# Patient Record
Sex: MALE | Race: BLACK OR AFRICAN AMERICAN | NOT HISPANIC OR LATINO | Employment: FULL TIME | URBAN - METROPOLITAN AREA
[De-identification: names, ages, dates, MRNs, and addresses within clinical notes are randomized per-mention and may not be internally consistent; named-entity substitution may affect disease eponyms.]

---

## 2021-02-20 ENCOUNTER — IMMUNIZATION (OUTPATIENT)
Dept: PRIMARY CARE CLINIC | Facility: CLINIC | Age: 66
End: 2021-02-20
Payer: COMMERCIAL

## 2021-02-20 DIAGNOSIS — Z23 NEED FOR VACCINATION: Primary | ICD-10-CM

## 2021-02-20 PROCEDURE — 91300 PR SARS-COV- 2 COVID-19 VACCINE, NO PRSV, 30MCG/0.3ML, IM: ICD-10-PCS | Mod: S$GLB,,, | Performed by: INTERNAL MEDICINE

## 2021-02-20 PROCEDURE — 0001A PR IMMUNIZ ADMIN, SARS-COV-2 COVID-19 VACC, 30MCG/0.3ML, 1ST DOSE: ICD-10-PCS | Mod: CV19,S$GLB,, | Performed by: INTERNAL MEDICINE

## 2021-02-20 PROCEDURE — 91300 PR SARS-COV- 2 COVID-19 VACCINE, NO PRSV, 30MCG/0.3ML, IM: CPT | Mod: S$GLB,,, | Performed by: INTERNAL MEDICINE

## 2021-02-20 PROCEDURE — 0001A PR IMMUNIZ ADMIN, SARS-COV-2 COVID-19 VACC, 30MCG/0.3ML, 1ST DOSE: CPT | Mod: CV19,S$GLB,, | Performed by: INTERNAL MEDICINE

## 2021-02-20 RX ADMIN — Medication 0.3 ML: at 03:02

## 2021-03-13 ENCOUNTER — IMMUNIZATION (OUTPATIENT)
Dept: PRIMARY CARE CLINIC | Facility: CLINIC | Age: 66
End: 2021-03-13
Payer: COMMERCIAL

## 2021-03-13 DIAGNOSIS — Z23 NEED FOR VACCINATION: Primary | ICD-10-CM

## 2021-03-13 PROCEDURE — 0002A PR IMMUNIZ ADMIN, SARS-COV-2 COVID-19 VACC, 30MCG/0.3ML, 2ND DOSE: ICD-10-PCS | Mod: CV19,S$GLB,, | Performed by: INTERNAL MEDICINE

## 2021-03-13 PROCEDURE — 91300 PR SARS-COV- 2 COVID-19 VACCINE, NO PRSV, 30MCG/0.3ML, IM: CPT | Mod: S$GLB,,, | Performed by: INTERNAL MEDICINE

## 2021-03-13 PROCEDURE — 91300 PR SARS-COV- 2 COVID-19 VACCINE, NO PRSV, 30MCG/0.3ML, IM: ICD-10-PCS | Mod: S$GLB,,, | Performed by: INTERNAL MEDICINE

## 2021-03-13 PROCEDURE — 0002A PR IMMUNIZ ADMIN, SARS-COV-2 COVID-19 VACC, 30MCG/0.3ML, 2ND DOSE: CPT | Mod: CV19,S$GLB,, | Performed by: INTERNAL MEDICINE

## 2021-03-13 RX ADMIN — Medication 0.3 ML: at 03:03

## 2021-04-07 ENCOUNTER — LAB VISIT (OUTPATIENT)
Dept: LAB | Facility: HOSPITAL | Age: 66
End: 2021-04-07
Attending: UROLOGY
Payer: MEDICARE

## 2021-04-07 ENCOUNTER — OFFICE VISIT (OUTPATIENT)
Dept: UROLOGY | Facility: CLINIC | Age: 66
End: 2021-04-07
Payer: MEDICARE

## 2021-04-07 VITALS
HEART RATE: 68 BPM | SYSTOLIC BLOOD PRESSURE: 121 MMHG | HEIGHT: 68 IN | DIASTOLIC BLOOD PRESSURE: 67 MMHG | BODY MASS INDEX: 31.11 KG/M2 | WEIGHT: 205.25 LBS

## 2021-04-07 DIAGNOSIS — N13.8 BPH WITH URINARY OBSTRUCTION: Primary | ICD-10-CM

## 2021-04-07 DIAGNOSIS — E11.69 TYPE 2 DIABETES MELLITUS WITH OTHER SPECIFIED COMPLICATION, WITHOUT LONG-TERM CURRENT USE OF INSULIN: ICD-10-CM

## 2021-04-07 DIAGNOSIS — I10 ESSENTIAL HYPERTENSION: ICD-10-CM

## 2021-04-07 DIAGNOSIS — N13.8 BPH WITH URINARY OBSTRUCTION: ICD-10-CM

## 2021-04-07 DIAGNOSIS — N52.01 ERECTILE DYSFUNCTION DUE TO ARTERIAL INSUFFICIENCY: ICD-10-CM

## 2021-04-07 DIAGNOSIS — N40.1 BPH WITH URINARY OBSTRUCTION: Primary | ICD-10-CM

## 2021-04-07 DIAGNOSIS — N40.1 BPH WITH URINARY OBSTRUCTION: ICD-10-CM

## 2021-04-07 PROBLEM — N52.9 ERECTILE DYSFUNCTION: Status: ACTIVE | Noted: 2021-04-07

## 2021-04-07 PROBLEM — E11.9 TYPE 2 DIABETES MELLITUS, WITHOUT LONG-TERM CURRENT USE OF INSULIN: Status: ACTIVE | Noted: 2021-04-07

## 2021-04-07 LAB
COMPLEXED PSA SERPL-MCNC: 0.37 NG/ML (ref 0–4)
ESTIMATED AVG GLUCOSE: 154 MG/DL (ref 68–131)
HBA1C MFR BLD: 7 % (ref 4–5.6)

## 2021-04-07 PROCEDURE — 3074F PR MOST RECENT SYSTOLIC BLOOD PRESSURE < 130 MM HG: ICD-10-PCS | Mod: CPTII,S$GLB,, | Performed by: UROLOGY

## 2021-04-07 PROCEDURE — 1101F PT FALLS ASSESS-DOCD LE1/YR: CPT | Mod: CPTII,S$GLB,, | Performed by: UROLOGY

## 2021-04-07 PROCEDURE — 99204 OFFICE O/P NEW MOD 45 MIN: CPT | Mod: S$GLB,,, | Performed by: UROLOGY

## 2021-04-07 PROCEDURE — 3288F PR FALLS RISK ASSESSMENT DOCUMENTED: ICD-10-PCS | Mod: CPTII,S$GLB,, | Performed by: UROLOGY

## 2021-04-07 PROCEDURE — 3008F BODY MASS INDEX DOCD: CPT | Mod: CPTII,S$GLB,, | Performed by: UROLOGY

## 2021-04-07 PROCEDURE — 83036 HEMOGLOBIN GLYCOSYLATED A1C: CPT | Performed by: UROLOGY

## 2021-04-07 PROCEDURE — 1159F MED LIST DOCD IN RCRD: CPT | Mod: S$GLB,,, | Performed by: UROLOGY

## 2021-04-07 PROCEDURE — 99204 PR OFFICE/OUTPT VISIT, NEW, LEVL IV, 45-59 MIN: ICD-10-PCS | Mod: S$GLB,,, | Performed by: UROLOGY

## 2021-04-07 PROCEDURE — 3078F DIAST BP <80 MM HG: CPT | Mod: CPTII,S$GLB,, | Performed by: UROLOGY

## 2021-04-07 PROCEDURE — 1126F AMNT PAIN NOTED NONE PRSNT: CPT | Mod: S$GLB,,, | Performed by: UROLOGY

## 2021-04-07 PROCEDURE — 3074F SYST BP LT 130 MM HG: CPT | Mod: CPTII,S$GLB,, | Performed by: UROLOGY

## 2021-04-07 PROCEDURE — 3078F PR MOST RECENT DIASTOLIC BLOOD PRESSURE < 80 MM HG: ICD-10-PCS | Mod: CPTII,S$GLB,, | Performed by: UROLOGY

## 2021-04-07 PROCEDURE — 1159F PR MEDICATION LIST DOCUMENTED IN MEDICAL RECORD: ICD-10-PCS | Mod: S$GLB,,, | Performed by: UROLOGY

## 2021-04-07 PROCEDURE — 99999 PR PBB SHADOW E&M-EST. PATIENT-LVL IV: ICD-10-PCS | Mod: PBBFAC,,, | Performed by: UROLOGY

## 2021-04-07 PROCEDURE — 1126F PR PAIN SEVERITY QUANTIFIED, NO PAIN PRESENT: ICD-10-PCS | Mod: S$GLB,,, | Performed by: UROLOGY

## 2021-04-07 PROCEDURE — 3008F PR BODY MASS INDEX (BMI) DOCUMENTED: ICD-10-PCS | Mod: CPTII,S$GLB,, | Performed by: UROLOGY

## 2021-04-07 PROCEDURE — 3288F FALL RISK ASSESSMENT DOCD: CPT | Mod: CPTII,S$GLB,, | Performed by: UROLOGY

## 2021-04-07 PROCEDURE — 99999 PR PBB SHADOW E&M-EST. PATIENT-LVL IV: CPT | Mod: PBBFAC,,, | Performed by: UROLOGY

## 2021-04-07 PROCEDURE — 36415 COLL VENOUS BLD VENIPUNCTURE: CPT | Performed by: UROLOGY

## 2021-04-07 PROCEDURE — 84153 ASSAY OF PSA TOTAL: CPT | Performed by: UROLOGY

## 2021-04-07 PROCEDURE — 1101F PR PT FALLS ASSESS DOC 0-1 FALLS W/OUT INJ PAST YR: ICD-10-PCS | Mod: CPTII,S$GLB,, | Performed by: UROLOGY

## 2021-04-07 PROCEDURE — 84403 ASSAY OF TOTAL TESTOSTERONE: CPT | Performed by: UROLOGY

## 2021-04-07 RX ORDER — METFORMIN HYDROCHLORIDE 500 MG/1
500 TABLET ORAL 2 TIMES DAILY WITH MEALS
COMMUNITY
Start: 2021-02-16 | End: 2021-08-19

## 2021-04-07 RX ORDER — TOPIRAMATE 100 MG/1
TABLET, FILM COATED ORAL
COMMUNITY
Start: 2021-02-16 | End: 2021-09-14 | Stop reason: SDUPTHER

## 2021-04-07 RX ORDER — LATANOPROST 50 UG/ML
1 SOLUTION/ DROPS OPHTHALMIC
COMMUNITY
End: 2022-02-10

## 2021-04-07 RX ORDER — AMLODIPINE BESYLATE 5 MG/1
5 TABLET ORAL DAILY
COMMUNITY
Start: 2021-02-16 | End: 2021-08-19

## 2021-04-07 RX ORDER — TAMSULOSIN HYDROCHLORIDE 0.4 MG/1
CAPSULE ORAL
COMMUNITY
Start: 2021-02-16 | End: 2021-04-07

## 2021-04-07 RX ORDER — FLUTICASONE PROPIONATE 50 MCG
SPRAY, SUSPENSION (ML) NASAL
COMMUNITY
Start: 2021-04-03

## 2021-04-07 RX ORDER — TAMSULOSIN HYDROCHLORIDE 0.4 MG/1
0.4 CAPSULE ORAL DAILY
Qty: 30 CAPSULE | Refills: 11 | Status: SHIPPED | OUTPATIENT
Start: 2021-04-07 | End: 2021-05-12

## 2021-04-07 RX ORDER — TADALAFIL 20 MG/1
20 TABLET ORAL DAILY
Qty: 10 TABLET | Refills: 11 | Status: SHIPPED | OUTPATIENT
Start: 2021-04-07 | End: 2021-08-16 | Stop reason: SDUPTHER

## 2021-04-08 LAB — TESTOST SERPL-MCNC: 273 NG/DL (ref 304–1227)

## 2021-04-09 ENCOUNTER — TELEPHONE (OUTPATIENT)
Dept: UROLOGY | Facility: CLINIC | Age: 66
End: 2021-04-09

## 2021-04-09 DIAGNOSIS — N52.01 ERECTILE DYSFUNCTION DUE TO ARTERIAL INSUFFICIENCY: Primary | ICD-10-CM

## 2021-04-12 ENCOUNTER — PATIENT MESSAGE (OUTPATIENT)
Dept: UROLOGY | Facility: CLINIC | Age: 66
End: 2021-04-12

## 2021-04-14 ENCOUNTER — TELEPHONE (OUTPATIENT)
Dept: UROLOGY | Facility: CLINIC | Age: 66
End: 2021-04-14

## 2021-04-14 ENCOUNTER — LAB VISIT (OUTPATIENT)
Dept: LAB | Facility: HOSPITAL | Age: 66
End: 2021-04-14
Attending: UROLOGY
Payer: MEDICARE

## 2021-04-14 DIAGNOSIS — N52.01 ERECTILE DYSFUNCTION DUE TO ARTERIAL INSUFFICIENCY: ICD-10-CM

## 2021-04-14 LAB
LH SERPL-ACNC: 2 MIU/ML (ref 0.6–12.1)
PROLACTIN SERPL IA-MCNC: 8.3 NG/ML (ref 3.5–19.4)
TESTOST SERPL-MCNC: 388 NG/DL (ref 304–1227)

## 2021-04-14 PROCEDURE — 36415 COLL VENOUS BLD VENIPUNCTURE: CPT | Performed by: UROLOGY

## 2021-04-14 PROCEDURE — 84146 ASSAY OF PROLACTIN: CPT | Performed by: UROLOGY

## 2021-04-14 PROCEDURE — 83002 ASSAY OF GONADOTROPIN (LH): CPT | Performed by: UROLOGY

## 2021-04-14 PROCEDURE — 84403 ASSAY OF TOTAL TESTOSTERONE: CPT | Performed by: UROLOGY

## 2021-04-15 ENCOUNTER — PATIENT MESSAGE (OUTPATIENT)
Dept: UROLOGY | Facility: CLINIC | Age: 66
End: 2021-04-15

## 2021-04-30 ENCOUNTER — OFFICE VISIT (OUTPATIENT)
Dept: INTERNAL MEDICINE | Facility: CLINIC | Age: 66
End: 2021-04-30
Payer: MEDICARE

## 2021-04-30 ENCOUNTER — LAB VISIT (OUTPATIENT)
Dept: LAB | Facility: HOSPITAL | Age: 66
End: 2021-04-30
Attending: FAMILY MEDICINE
Payer: MEDICARE

## 2021-04-30 VITALS
BODY MASS INDEX: 31.11 KG/M2 | HEART RATE: 63 BPM | OXYGEN SATURATION: 99 % | WEIGHT: 205.25 LBS | DIASTOLIC BLOOD PRESSURE: 68 MMHG | SYSTOLIC BLOOD PRESSURE: 128 MMHG | HEIGHT: 68 IN

## 2021-04-30 DIAGNOSIS — N40.1 BPH WITH OBSTRUCTION/LOWER URINARY TRACT SYMPTOMS: ICD-10-CM

## 2021-04-30 DIAGNOSIS — Z76.89 ENCOUNTER TO ESTABLISH CARE WITH NEW DOCTOR: ICD-10-CM

## 2021-04-30 DIAGNOSIS — I10 ESSENTIAL HYPERTENSION: ICD-10-CM

## 2021-04-30 DIAGNOSIS — Z00.00 ANNUAL PHYSICAL EXAM: ICD-10-CM

## 2021-04-30 DIAGNOSIS — N13.8 BPH WITH OBSTRUCTION/LOWER URINARY TRACT SYMPTOMS: ICD-10-CM

## 2021-04-30 DIAGNOSIS — Z00.00 ANNUAL PHYSICAL EXAM: Primary | ICD-10-CM

## 2021-04-30 DIAGNOSIS — E11.9 TYPE 2 DIABETES MELLITUS WITHOUT COMPLICATION, WITHOUT LONG-TERM CURRENT USE OF INSULIN: ICD-10-CM

## 2021-04-30 DIAGNOSIS — E66.09 CLASS 1 OBESITY DUE TO EXCESS CALORIES WITH SERIOUS COMORBIDITY AND BODY MASS INDEX (BMI) OF 31.0 TO 31.9 IN ADULT: ICD-10-CM

## 2021-04-30 PROBLEM — E66.811 CLASS 1 OBESITY DUE TO EXCESS CALORIES WITH SERIOUS COMORBIDITY AND BODY MASS INDEX (BMI) OF 31.0 TO 31.9 IN ADULT: Status: ACTIVE | Noted: 2021-04-30

## 2021-04-30 LAB
ALBUMIN SERPL BCP-MCNC: 4 G/DL (ref 3.5–5.2)
ALP SERPL-CCNC: 71 U/L (ref 55–135)
ALT SERPL W/O P-5'-P-CCNC: 80 U/L (ref 10–44)
ANION GAP SERPL CALC-SCNC: 9 MMOL/L (ref 8–16)
AST SERPL-CCNC: 47 U/L (ref 10–40)
BILIRUB SERPL-MCNC: 0.6 MG/DL (ref 0.1–1)
BUN SERPL-MCNC: 14 MG/DL (ref 8–23)
CALCIUM SERPL-MCNC: 9.8 MG/DL (ref 8.7–10.5)
CHLORIDE SERPL-SCNC: 107 MMOL/L (ref 95–110)
CHOLEST SERPL-MCNC: 117 MG/DL (ref 120–199)
CHOLEST/HDLC SERPL: 2.7 {RATIO} (ref 2–5)
CO2 SERPL-SCNC: 23 MMOL/L (ref 23–29)
CREAT SERPL-MCNC: 1.1 MG/DL (ref 0.5–1.4)
EST. GFR  (AFRICAN AMERICAN): >60 ML/MIN/1.73 M^2
EST. GFR  (NON AFRICAN AMERICAN): >60 ML/MIN/1.73 M^2
GLUCOSE SERPL-MCNC: 124 MG/DL (ref 70–110)
HCV AB SERPL QL IA: NEGATIVE
HDLC SERPL-MCNC: 44 MG/DL (ref 40–75)
HDLC SERPL: 37.6 % (ref 20–50)
LDLC SERPL CALC-MCNC: 62 MG/DL (ref 63–159)
NONHDLC SERPL-MCNC: 73 MG/DL
POTASSIUM SERPL-SCNC: 4.5 MMOL/L (ref 3.5–5.1)
PROT SERPL-MCNC: 8.1 G/DL (ref 6–8.4)
SODIUM SERPL-SCNC: 139 MMOL/L (ref 136–145)
TRIGL SERPL-MCNC: 55 MG/DL (ref 30–150)

## 2021-04-30 PROCEDURE — 3288F PR FALLS RISK ASSESSMENT DOCUMENTED: ICD-10-PCS | Mod: CPTII,S$GLB,, | Performed by: FAMILY MEDICINE

## 2021-04-30 PROCEDURE — 1126F PR PAIN SEVERITY QUANTIFIED, NO PAIN PRESENT: ICD-10-PCS | Mod: S$GLB,,, | Performed by: FAMILY MEDICINE

## 2021-04-30 PROCEDURE — 1126F AMNT PAIN NOTED NONE PRSNT: CPT | Mod: S$GLB,,, | Performed by: FAMILY MEDICINE

## 2021-04-30 PROCEDURE — 3288F FALL RISK ASSESSMENT DOCD: CPT | Mod: CPTII,S$GLB,, | Performed by: FAMILY MEDICINE

## 2021-04-30 PROCEDURE — 86803 HEPATITIS C AB TEST: CPT | Performed by: FAMILY MEDICINE

## 2021-04-30 PROCEDURE — 99999 PR PBB SHADOW E&M-EST. PATIENT-LVL IV: ICD-10-PCS | Mod: PBBFAC,,, | Performed by: FAMILY MEDICINE

## 2021-04-30 PROCEDURE — 3051F HG A1C>EQUAL 7.0%<8.0%: CPT | Mod: CPTII,S$GLB,, | Performed by: FAMILY MEDICINE

## 2021-04-30 PROCEDURE — 99999 PR PBB SHADOW E&M-EST. PATIENT-LVL IV: CPT | Mod: PBBFAC,,, | Performed by: FAMILY MEDICINE

## 2021-04-30 PROCEDURE — 3051F PR MOST RECENT HEMOGLOBIN A1C LEVEL 7.0 - < 8.0%: ICD-10-PCS | Mod: CPTII,S$GLB,, | Performed by: FAMILY MEDICINE

## 2021-04-30 PROCEDURE — 36415 COLL VENOUS BLD VENIPUNCTURE: CPT | Performed by: FAMILY MEDICINE

## 2021-04-30 PROCEDURE — 1101F PT FALLS ASSESS-DOCD LE1/YR: CPT | Mod: CPTII,S$GLB,, | Performed by: FAMILY MEDICINE

## 2021-04-30 PROCEDURE — 80061 LIPID PANEL: CPT | Performed by: FAMILY MEDICINE

## 2021-04-30 PROCEDURE — 99204 OFFICE O/P NEW MOD 45 MIN: CPT | Mod: S$GLB,,, | Performed by: FAMILY MEDICINE

## 2021-04-30 PROCEDURE — 1101F PR PT FALLS ASSESS DOC 0-1 FALLS W/OUT INJ PAST YR: ICD-10-PCS | Mod: CPTII,S$GLB,, | Performed by: FAMILY MEDICINE

## 2021-04-30 PROCEDURE — 3008F PR BODY MASS INDEX (BMI) DOCUMENTED: ICD-10-PCS | Mod: CPTII,S$GLB,, | Performed by: FAMILY MEDICINE

## 2021-04-30 PROCEDURE — 3008F BODY MASS INDEX DOCD: CPT | Mod: CPTII,S$GLB,, | Performed by: FAMILY MEDICINE

## 2021-04-30 PROCEDURE — 99204 PR OFFICE/OUTPT VISIT, NEW, LEVL IV, 45-59 MIN: ICD-10-PCS | Mod: S$GLB,,, | Performed by: FAMILY MEDICINE

## 2021-04-30 PROCEDURE — 80053 COMPREHEN METABOLIC PANEL: CPT | Performed by: FAMILY MEDICINE

## 2021-04-30 RX ORDER — ATORVASTATIN CALCIUM 20 MG/1
20 TABLET, FILM COATED ORAL DAILY
Qty: 90 TABLET | Refills: 3 | Status: SHIPPED | OUTPATIENT
Start: 2021-04-30 | End: 2021-08-19

## 2021-04-30 RX ORDER — ENALAPRIL MALEATE 20 MG/1
20 TABLET ORAL
COMMUNITY
End: 2021-04-30

## 2021-05-12 ENCOUNTER — OFFICE VISIT (OUTPATIENT)
Dept: UROLOGY | Facility: CLINIC | Age: 66
End: 2021-05-12
Payer: MEDICARE

## 2021-05-12 VITALS
WEIGHT: 205.69 LBS | HEART RATE: 67 BPM | DIASTOLIC BLOOD PRESSURE: 77 MMHG | HEIGHT: 68 IN | SYSTOLIC BLOOD PRESSURE: 131 MMHG | BODY MASS INDEX: 31.17 KG/M2

## 2021-05-12 DIAGNOSIS — N13.8 BPH WITH OBSTRUCTION/LOWER URINARY TRACT SYMPTOMS: ICD-10-CM

## 2021-05-12 DIAGNOSIS — E11.69 TYPE 2 DIABETES MELLITUS WITH OTHER SPECIFIED COMPLICATION, WITHOUT LONG-TERM CURRENT USE OF INSULIN: ICD-10-CM

## 2021-05-12 DIAGNOSIS — N52.01 ERECTILE DYSFUNCTION DUE TO ARTERIAL INSUFFICIENCY: Primary | ICD-10-CM

## 2021-05-12 DIAGNOSIS — I10 ESSENTIAL HYPERTENSION: ICD-10-CM

## 2021-05-12 DIAGNOSIS — N40.1 BPH WITH OBSTRUCTION/LOWER URINARY TRACT SYMPTOMS: ICD-10-CM

## 2021-05-12 PROCEDURE — 1126F AMNT PAIN NOTED NONE PRSNT: CPT | Mod: S$GLB,,, | Performed by: UROLOGY

## 2021-05-12 PROCEDURE — 3051F HG A1C>EQUAL 7.0%<8.0%: CPT | Mod: CPTII,S$GLB,, | Performed by: UROLOGY

## 2021-05-12 PROCEDURE — 3008F PR BODY MASS INDEX (BMI) DOCUMENTED: ICD-10-PCS | Mod: CPTII,S$GLB,, | Performed by: UROLOGY

## 2021-05-12 PROCEDURE — 1159F PR MEDICATION LIST DOCUMENTED IN MEDICAL RECORD: ICD-10-PCS | Mod: S$GLB,,, | Performed by: UROLOGY

## 2021-05-12 PROCEDURE — 1159F MED LIST DOCD IN RCRD: CPT | Mod: S$GLB,,, | Performed by: UROLOGY

## 2021-05-12 PROCEDURE — 99999 PR PBB SHADOW E&M-EST. PATIENT-LVL III: ICD-10-PCS | Mod: PBBFAC,,, | Performed by: UROLOGY

## 2021-05-12 PROCEDURE — 1126F PR PAIN SEVERITY QUANTIFIED, NO PAIN PRESENT: ICD-10-PCS | Mod: S$GLB,,, | Performed by: UROLOGY

## 2021-05-12 PROCEDURE — 99999 PR PBB SHADOW E&M-EST. PATIENT-LVL III: CPT | Mod: PBBFAC,,, | Performed by: UROLOGY

## 2021-05-12 PROCEDURE — 3008F BODY MASS INDEX DOCD: CPT | Mod: CPTII,S$GLB,, | Performed by: UROLOGY

## 2021-05-12 PROCEDURE — 3051F PR MOST RECENT HEMOGLOBIN A1C LEVEL 7.0 - < 8.0%: ICD-10-PCS | Mod: CPTII,S$GLB,, | Performed by: UROLOGY

## 2021-05-12 PROCEDURE — 99214 OFFICE O/P EST MOD 30 MIN: CPT | Mod: S$GLB,,, | Performed by: UROLOGY

## 2021-05-12 PROCEDURE — 99214 PR OFFICE/OUTPT VISIT, EST, LEVL IV, 30-39 MIN: ICD-10-PCS | Mod: S$GLB,,, | Performed by: UROLOGY

## 2021-05-12 RX ORDER — TAMSULOSIN HYDROCHLORIDE 0.4 MG/1
0.4 CAPSULE ORAL DAILY
Qty: 90 CAPSULE | Refills: 3 | Status: SHIPPED | OUTPATIENT
Start: 2021-05-12 | End: 2021-08-16 | Stop reason: SDUPTHER

## 2021-05-12 RX ORDER — OXYBUTYNIN CHLORIDE 10 MG/1
10 TABLET, EXTENDED RELEASE ORAL DAILY
Qty: 90 TABLET | Refills: 3 | Status: SHIPPED | OUTPATIENT
Start: 2021-05-12 | End: 2021-08-16

## 2021-06-22 ENCOUNTER — PATIENT OUTREACH (OUTPATIENT)
Dept: ADMINISTRATIVE | Facility: HOSPITAL | Age: 66
End: 2021-06-22

## 2021-07-30 ENCOUNTER — OFFICE VISIT (OUTPATIENT)
Dept: INTERNAL MEDICINE | Facility: CLINIC | Age: 66
End: 2021-07-30
Payer: MEDICARE

## 2021-07-30 ENCOUNTER — LAB VISIT (OUTPATIENT)
Dept: LAB | Facility: HOSPITAL | Age: 66
End: 2021-07-30
Attending: FAMILY MEDICINE
Payer: MEDICARE

## 2021-07-30 VITALS
HEIGHT: 68 IN | HEART RATE: 65 BPM | DIASTOLIC BLOOD PRESSURE: 64 MMHG | OXYGEN SATURATION: 98 % | BODY MASS INDEX: 30.87 KG/M2 | WEIGHT: 203.69 LBS | SYSTOLIC BLOOD PRESSURE: 122 MMHG

## 2021-07-30 DIAGNOSIS — E11.9 TYPE 2 DIABETES MELLITUS WITHOUT COMPLICATION, WITHOUT LONG-TERM CURRENT USE OF INSULIN: ICD-10-CM

## 2021-07-30 DIAGNOSIS — E66.09 CLASS 1 OBESITY DUE TO EXCESS CALORIES WITH SERIOUS COMORBIDITY AND BODY MASS INDEX (BMI) OF 30.0 TO 30.9 IN ADULT: ICD-10-CM

## 2021-07-30 DIAGNOSIS — I10 ESSENTIAL HYPERTENSION: ICD-10-CM

## 2021-07-30 DIAGNOSIS — R80.9 MICROALBUMINURIA DUE TO TYPE 2 DIABETES MELLITUS: ICD-10-CM

## 2021-07-30 DIAGNOSIS — E11.29 MICROALBUMINURIA DUE TO TYPE 2 DIABETES MELLITUS: ICD-10-CM

## 2021-07-30 DIAGNOSIS — R74.01 ELEVATED TRANSAMINASE LEVEL: Primary | ICD-10-CM

## 2021-07-30 DIAGNOSIS — Z78.9 ACE INHIBITOR INTOLERANCE: ICD-10-CM

## 2021-07-30 DIAGNOSIS — E11.69 TYPE 2 DIABETES MELLITUS WITH OBESITY: ICD-10-CM

## 2021-07-30 DIAGNOSIS — K21.9 GASTROESOPHAGEAL REFLUX DISEASE, UNSPECIFIED WHETHER ESOPHAGITIS PRESENT: ICD-10-CM

## 2021-07-30 DIAGNOSIS — I15.2 HYPERTENSION ASSOCIATED WITH DIABETES: ICD-10-CM

## 2021-07-30 DIAGNOSIS — E11.29 TYPE 2 DIABETES MELLITUS WITH MICROALBUMINURIA, WITHOUT LONG-TERM CURRENT USE OF INSULIN: ICD-10-CM

## 2021-07-30 DIAGNOSIS — N13.8 BPH WITH OBSTRUCTION/LOWER URINARY TRACT SYMPTOMS: ICD-10-CM

## 2021-07-30 DIAGNOSIS — E78.5 HYPERLIPIDEMIA ASSOCIATED WITH TYPE 2 DIABETES MELLITUS: ICD-10-CM

## 2021-07-30 DIAGNOSIS — G47.33 OSA ON CPAP: ICD-10-CM

## 2021-07-30 DIAGNOSIS — E66.9 TYPE 2 DIABETES MELLITUS WITH OBESITY: ICD-10-CM

## 2021-07-30 DIAGNOSIS — R74.01 ELEVATED TRANSAMINASE LEVEL: ICD-10-CM

## 2021-07-30 DIAGNOSIS — E11.69 HYPERLIPIDEMIA ASSOCIATED WITH TYPE 2 DIABETES MELLITUS: ICD-10-CM

## 2021-07-30 DIAGNOSIS — R80.9 TYPE 2 DIABETES MELLITUS WITH MICROALBUMINURIA, WITHOUT LONG-TERM CURRENT USE OF INSULIN: ICD-10-CM

## 2021-07-30 DIAGNOSIS — N40.1 BPH WITH OBSTRUCTION/LOWER URINARY TRACT SYMPTOMS: ICD-10-CM

## 2021-07-30 DIAGNOSIS — E11.59 HYPERTENSION ASSOCIATED WITH DIABETES: ICD-10-CM

## 2021-07-30 PROBLEM — T38.3X5A METFORMIN ADVERSE REACTION: Status: ACTIVE | Noted: 2021-07-30

## 2021-07-30 LAB
BASOPHILS # BLD AUTO: 0.06 K/UL (ref 0–0.2)
BASOPHILS NFR BLD: 1 % (ref 0–1.9)
DIFFERENTIAL METHOD: ABNORMAL
EOSINOPHIL # BLD AUTO: 0 K/UL (ref 0–0.5)
EOSINOPHIL NFR BLD: 0.5 % (ref 0–8)
ERYTHROCYTE [DISTWIDTH] IN BLOOD BY AUTOMATED COUNT: 12.8 % (ref 11.5–14.5)
HCT VFR BLD AUTO: 48.4 % (ref 40–54)
HGB BLD-MCNC: 15.4 G/DL (ref 14–18)
IMM GRANULOCYTES # BLD AUTO: 0.02 K/UL (ref 0–0.04)
IMM GRANULOCYTES NFR BLD AUTO: 0.3 % (ref 0–0.5)
LYMPHOCYTES # BLD AUTO: 2.5 K/UL (ref 1–4.8)
LYMPHOCYTES NFR BLD: 42.7 % (ref 18–48)
MCH RBC QN AUTO: 30.2 PG (ref 27–31)
MCHC RBC AUTO-ENTMCNC: 31.8 G/DL (ref 32–36)
MCV RBC AUTO: 95 FL (ref 82–98)
MONOCYTES # BLD AUTO: 0.5 K/UL (ref 0.3–1)
MONOCYTES NFR BLD: 8.8 % (ref 4–15)
NEUTROPHILS # BLD AUTO: 2.8 K/UL (ref 1.8–7.7)
NEUTROPHILS NFR BLD: 46.7 % (ref 38–73)
NRBC BLD-RTO: 0 /100 WBC
PLATELET # BLD AUTO: 198 K/UL (ref 150–450)
PMV BLD AUTO: 12.5 FL (ref 9.2–12.9)
RBC # BLD AUTO: 5.1 M/UL (ref 4.6–6.2)
WBC # BLD AUTO: 5.93 K/UL (ref 3.9–12.7)

## 2021-07-30 PROCEDURE — 86677 HELICOBACTER PYLORI ANTIBODY: CPT | Performed by: FAMILY MEDICINE

## 2021-07-30 PROCEDURE — 3074F PR MOST RECENT SYSTOLIC BLOOD PRESSURE < 130 MM HG: ICD-10-PCS | Mod: CPTII,S$GLB,, | Performed by: FAMILY MEDICINE

## 2021-07-30 PROCEDURE — 1101F PR PT FALLS ASSESS DOC 0-1 FALLS W/OUT INJ PAST YR: ICD-10-PCS | Mod: CPTII,S$GLB,, | Performed by: FAMILY MEDICINE

## 2021-07-30 PROCEDURE — 1159F MED LIST DOCD IN RCRD: CPT | Mod: CPTII,S$GLB,, | Performed by: FAMILY MEDICINE

## 2021-07-30 PROCEDURE — 82728 ASSAY OF FERRITIN: CPT | Performed by: FAMILY MEDICINE

## 2021-07-30 PROCEDURE — 1160F PR REVIEW ALL MEDS BY PRESCRIBER/CLIN PHARMACIST DOCUMENTED: ICD-10-PCS | Mod: CPTII,S$GLB,, | Performed by: FAMILY MEDICINE

## 2021-07-30 PROCEDURE — 80076 HEPATIC FUNCTION PANEL: CPT | Performed by: FAMILY MEDICINE

## 2021-07-30 PROCEDURE — 1159F PR MEDICATION LIST DOCUMENTED IN MEDICAL RECORD: ICD-10-PCS | Mod: CPTII,S$GLB,, | Performed by: FAMILY MEDICINE

## 2021-07-30 PROCEDURE — 1160F RVW MEDS BY RX/DR IN RCRD: CPT | Mod: CPTII,S$GLB,, | Performed by: FAMILY MEDICINE

## 2021-07-30 PROCEDURE — 3051F PR MOST RECENT HEMOGLOBIN A1C LEVEL 7.0 - < 8.0%: ICD-10-PCS | Mod: CPTII,S$GLB,, | Performed by: FAMILY MEDICINE

## 2021-07-30 PROCEDURE — 3288F FALL RISK ASSESSMENT DOCD: CPT | Mod: CPTII,S$GLB,, | Performed by: FAMILY MEDICINE

## 2021-07-30 PROCEDURE — 1126F PR PAIN SEVERITY QUANTIFIED, NO PAIN PRESENT: ICD-10-PCS | Mod: CPTII,S$GLB,, | Performed by: FAMILY MEDICINE

## 2021-07-30 PROCEDURE — 3078F DIAST BP <80 MM HG: CPT | Mod: CPTII,S$GLB,, | Performed by: FAMILY MEDICINE

## 2021-07-30 PROCEDURE — 1101F PT FALLS ASSESS-DOCD LE1/YR: CPT | Mod: CPTII,S$GLB,, | Performed by: FAMILY MEDICINE

## 2021-07-30 PROCEDURE — 83036 HEMOGLOBIN GLYCOSYLATED A1C: CPT | Performed by: FAMILY MEDICINE

## 2021-07-30 PROCEDURE — 3074F SYST BP LT 130 MM HG: CPT | Mod: CPTII,S$GLB,, | Performed by: FAMILY MEDICINE

## 2021-07-30 PROCEDURE — 99999 PR PBB SHADOW E&M-EST. PATIENT-LVL IV: CPT | Mod: PBBFAC,,, | Performed by: FAMILY MEDICINE

## 2021-07-30 PROCEDURE — 99214 OFFICE O/P EST MOD 30 MIN: CPT | Mod: S$GLB,,, | Performed by: FAMILY MEDICINE

## 2021-07-30 PROCEDURE — 1126F AMNT PAIN NOTED NONE PRSNT: CPT | Mod: CPTII,S$GLB,, | Performed by: FAMILY MEDICINE

## 2021-07-30 PROCEDURE — 3051F HG A1C>EQUAL 7.0%<8.0%: CPT | Mod: CPTII,S$GLB,, | Performed by: FAMILY MEDICINE

## 2021-07-30 PROCEDURE — 3008F PR BODY MASS INDEX (BMI) DOCUMENTED: ICD-10-PCS | Mod: CPTII,S$GLB,, | Performed by: FAMILY MEDICINE

## 2021-07-30 PROCEDURE — 36415 COLL VENOUS BLD VENIPUNCTURE: CPT | Performed by: FAMILY MEDICINE

## 2021-07-30 PROCEDURE — 3078F PR MOST RECENT DIASTOLIC BLOOD PRESSURE < 80 MM HG: ICD-10-PCS | Mod: CPTII,S$GLB,, | Performed by: FAMILY MEDICINE

## 2021-07-30 PROCEDURE — 99214 PR OFFICE/OUTPT VISIT, EST, LEVL IV, 30-39 MIN: ICD-10-PCS | Mod: S$GLB,,, | Performed by: FAMILY MEDICINE

## 2021-07-30 PROCEDURE — 83540 ASSAY OF IRON: CPT | Performed by: FAMILY MEDICINE

## 2021-07-30 PROCEDURE — 99999 PR PBB SHADOW E&M-EST. PATIENT-LVL IV: ICD-10-PCS | Mod: PBBFAC,,, | Performed by: FAMILY MEDICINE

## 2021-07-30 PROCEDURE — 85025 COMPLETE CBC W/AUTO DIFF WBC: CPT | Performed by: FAMILY MEDICINE

## 2021-07-30 PROCEDURE — 80074 ACUTE HEPATITIS PANEL: CPT | Performed by: FAMILY MEDICINE

## 2021-07-30 PROCEDURE — 3288F PR FALLS RISK ASSESSMENT DOCUMENTED: ICD-10-PCS | Mod: CPTII,S$GLB,, | Performed by: FAMILY MEDICINE

## 2021-07-30 PROCEDURE — 3008F BODY MASS INDEX DOCD: CPT | Mod: CPTII,S$GLB,, | Performed by: FAMILY MEDICINE

## 2021-07-30 RX ORDER — LOSARTAN POTASSIUM 25 MG/1
25 TABLET ORAL DAILY
Qty: 90 TABLET | Refills: 3 | Status: SHIPPED | OUTPATIENT
Start: 2021-07-30 | End: 2022-09-12

## 2021-07-31 LAB
ALBUMIN SERPL BCP-MCNC: 4.2 G/DL (ref 3.5–5.2)
ALP SERPL-CCNC: 67 U/L (ref 55–135)
ALT SERPL W/O P-5'-P-CCNC: 83 U/L (ref 10–44)
AST SERPL-CCNC: 47 U/L (ref 10–40)
BILIRUB DIRECT SERPL-MCNC: 0.2 MG/DL (ref 0.1–0.3)
BILIRUB SERPL-MCNC: 0.4 MG/DL (ref 0.1–1)
ESTIMATED AVG GLUCOSE: 146 MG/DL (ref 68–131)
FERRITIN SERPL-MCNC: 595 NG/ML (ref 20–300)
H PYLORI IGG SERPL QL IA: POSITIVE
HBA1C MFR BLD: 6.7 % (ref 4–5.6)
IRON SERPL-MCNC: 102 UG/DL (ref 45–160)
PROT SERPL-MCNC: 8.2 G/DL (ref 6–8.4)
SATURATED IRON: 28 % (ref 20–50)
TOTAL IRON BINDING CAPACITY: 367 UG/DL (ref 250–450)
TRANSFERRIN SERPL-MCNC: 248 MG/DL (ref 200–375)

## 2021-08-02 LAB
HAV IGM SERPL QL IA: NEGATIVE
HBV CORE IGM SERPL QL IA: NEGATIVE
HBV SURFACE AG SERPL QL IA: NEGATIVE
HCV AB SERPL QL IA: NEGATIVE

## 2021-08-04 ENCOUNTER — TELEPHONE (OUTPATIENT)
Dept: INTERNAL MEDICINE | Facility: CLINIC | Age: 66
End: 2021-08-04

## 2021-08-04 DIAGNOSIS — A04.8 H. PYLORI INFECTION: Primary | ICD-10-CM

## 2021-08-04 DIAGNOSIS — R74.01 ELEVATED TRANSAMINASE LEVEL: ICD-10-CM

## 2021-08-04 RX ORDER — PANTOPRAZOLE SODIUM 40 MG/1
40 TABLET, DELAYED RELEASE ORAL 2 TIMES DAILY
Qty: 60 TABLET | Refills: 0 | Status: SHIPPED | OUTPATIENT
Start: 2021-08-04 | End: 2021-11-12

## 2021-08-04 RX ORDER — METRONIDAZOLE 250 MG/1
250 TABLET ORAL 4 TIMES DAILY
Qty: 40 TABLET | Refills: 0 | Status: SHIPPED | OUTPATIENT
Start: 2021-08-04 | End: 2021-08-13 | Stop reason: SDUPTHER

## 2021-08-04 RX ORDER — METRONIDAZOLE 250 MG/1
250 TABLET ORAL 4 TIMES DAILY
Qty: 40 TABLET | Refills: 0 | Status: SHIPPED | OUTPATIENT
Start: 2021-08-04 | End: 2021-08-04

## 2021-08-04 RX ORDER — TETRACYCLINE HYDROCHLORIDE 500 MG/1
500 CAPSULE ORAL 4 TIMES DAILY
Qty: 40 CAPSULE | Refills: 0 | Status: SHIPPED | OUTPATIENT
Start: 2021-08-04 | End: 2021-11-12

## 2021-08-04 RX ORDER — PANTOPRAZOLE SODIUM 40 MG/1
40 TABLET, DELAYED RELEASE ORAL 2 TIMES DAILY
Qty: 60 TABLET | Refills: 0 | Status: SHIPPED | OUTPATIENT
Start: 2021-08-04 | End: 2021-08-04

## 2021-08-04 RX ORDER — TETRACYCLINE HYDROCHLORIDE 500 MG/1
500 CAPSULE ORAL 4 TIMES DAILY
Qty: 40 CAPSULE | Refills: 0 | Status: SHIPPED | OUTPATIENT
Start: 2021-08-04 | End: 2021-08-04

## 2021-08-05 ENCOUNTER — TELEPHONE (OUTPATIENT)
Dept: NEUROLOGY | Facility: CLINIC | Age: 66
End: 2021-08-05

## 2021-08-08 ENCOUNTER — DOCUMENTATION ONLY (OUTPATIENT)
Dept: TRANSPLANT | Facility: CLINIC | Age: 66
End: 2021-08-08

## 2021-08-09 ENCOUNTER — TELEPHONE (OUTPATIENT)
Dept: INTERNAL MEDICINE | Facility: CLINIC | Age: 66
End: 2021-08-09

## 2021-08-09 DIAGNOSIS — A04.8 H. PYLORI INFECTION: Primary | ICD-10-CM

## 2021-08-09 RX ORDER — BISMUTH SUBSALICYLATE 525 MG/30ML
15 LIQUID ORAL 4 TIMES DAILY
Qty: 473 ML | Refills: 0 | Status: SHIPPED | OUTPATIENT
Start: 2021-08-09 | End: 2021-08-19

## 2021-08-11 ENCOUNTER — PATIENT OUTREACH (OUTPATIENT)
Dept: ADMINISTRATIVE | Facility: OTHER | Age: 66
End: 2021-08-11

## 2021-08-11 DIAGNOSIS — R74.01 ELEVATED TRANSAMINASE LEVEL: Primary | ICD-10-CM

## 2021-08-13 DIAGNOSIS — A04.8 H. PYLORI INFECTION: ICD-10-CM

## 2021-08-13 RX ORDER — METRONIDAZOLE 250 MG/1
250 TABLET ORAL 4 TIMES DAILY
Qty: 40 TABLET | Refills: 0 | Status: SHIPPED | OUTPATIENT
Start: 2021-08-13 | End: 2021-11-12

## 2021-08-16 ENCOUNTER — OFFICE VISIT (OUTPATIENT)
Dept: UROLOGY | Facility: CLINIC | Age: 66
End: 2021-08-16
Payer: MEDICARE

## 2021-08-16 VITALS
HEIGHT: 68 IN | DIASTOLIC BLOOD PRESSURE: 75 MMHG | BODY MASS INDEX: 30.41 KG/M2 | WEIGHT: 200.63 LBS | SYSTOLIC BLOOD PRESSURE: 151 MMHG | HEART RATE: 64 BPM

## 2021-08-16 DIAGNOSIS — N52.01 ERECTILE DYSFUNCTION DUE TO ARTERIAL INSUFFICIENCY: Primary | ICD-10-CM

## 2021-08-16 DIAGNOSIS — E11.29 TYPE 2 DIABETES MELLITUS WITH MICROALBUMINURIA, WITHOUT LONG-TERM CURRENT USE OF INSULIN: ICD-10-CM

## 2021-08-16 DIAGNOSIS — R80.9 TYPE 2 DIABETES MELLITUS WITH MICROALBUMINURIA, WITHOUT LONG-TERM CURRENT USE OF INSULIN: ICD-10-CM

## 2021-08-16 DIAGNOSIS — E11.69 HYPERLIPIDEMIA ASSOCIATED WITH TYPE 2 DIABETES MELLITUS: ICD-10-CM

## 2021-08-16 DIAGNOSIS — N40.1 BPH WITH OBSTRUCTION/LOWER URINARY TRACT SYMPTOMS: ICD-10-CM

## 2021-08-16 DIAGNOSIS — N13.8 BPH WITH OBSTRUCTION/LOWER URINARY TRACT SYMPTOMS: ICD-10-CM

## 2021-08-16 DIAGNOSIS — I15.2 HYPERTENSION ASSOCIATED WITH DIABETES: ICD-10-CM

## 2021-08-16 DIAGNOSIS — E78.5 HYPERLIPIDEMIA ASSOCIATED WITH TYPE 2 DIABETES MELLITUS: ICD-10-CM

## 2021-08-16 DIAGNOSIS — E11.59 HYPERTENSION ASSOCIATED WITH DIABETES: ICD-10-CM

## 2021-08-16 PROCEDURE — 1159F MED LIST DOCD IN RCRD: CPT | Mod: CPTII,S$GLB,, | Performed by: UROLOGY

## 2021-08-16 PROCEDURE — 1160F RVW MEDS BY RX/DR IN RCRD: CPT | Mod: CPTII,S$GLB,, | Performed by: UROLOGY

## 2021-08-16 PROCEDURE — 99214 PR OFFICE/OUTPT VISIT, EST, LEVL IV, 30-39 MIN: ICD-10-PCS | Mod: S$GLB,,, | Performed by: UROLOGY

## 2021-08-16 PROCEDURE — 1126F PR PAIN SEVERITY QUANTIFIED, NO PAIN PRESENT: ICD-10-PCS | Mod: CPTII,S$GLB,, | Performed by: UROLOGY

## 2021-08-16 PROCEDURE — 3008F BODY MASS INDEX DOCD: CPT | Mod: CPTII,S$GLB,, | Performed by: UROLOGY

## 2021-08-16 PROCEDURE — 99999 PR PBB SHADOW E&M-EST. PATIENT-LVL III: CPT | Mod: PBBFAC,,, | Performed by: UROLOGY

## 2021-08-16 PROCEDURE — 3044F HG A1C LEVEL LT 7.0%: CPT | Mod: CPTII,S$GLB,, | Performed by: UROLOGY

## 2021-08-16 PROCEDURE — 3077F SYST BP >= 140 MM HG: CPT | Mod: CPTII,S$GLB,, | Performed by: UROLOGY

## 2021-08-16 PROCEDURE — 3288F PR FALLS RISK ASSESSMENT DOCUMENTED: ICD-10-PCS | Mod: CPTII,S$GLB,, | Performed by: UROLOGY

## 2021-08-16 PROCEDURE — 99499 UNLISTED E&M SERVICE: CPT | Mod: S$GLB,,, | Performed by: UROLOGY

## 2021-08-16 PROCEDURE — 1101F PR PT FALLS ASSESS DOC 0-1 FALLS W/OUT INJ PAST YR: ICD-10-PCS | Mod: CPTII,S$GLB,, | Performed by: UROLOGY

## 2021-08-16 PROCEDURE — 3008F PR BODY MASS INDEX (BMI) DOCUMENTED: ICD-10-PCS | Mod: CPTII,S$GLB,, | Performed by: UROLOGY

## 2021-08-16 PROCEDURE — 1101F PT FALLS ASSESS-DOCD LE1/YR: CPT | Mod: CPTII,S$GLB,, | Performed by: UROLOGY

## 2021-08-16 PROCEDURE — 3077F PR MOST RECENT SYSTOLIC BLOOD PRESSURE >= 140 MM HG: ICD-10-PCS | Mod: CPTII,S$GLB,, | Performed by: UROLOGY

## 2021-08-16 PROCEDURE — 99499 RISK ADDL DX/OHS AUDIT: ICD-10-PCS | Mod: S$GLB,,, | Performed by: UROLOGY

## 2021-08-16 PROCEDURE — 3044F PR MOST RECENT HEMOGLOBIN A1C LEVEL <7.0%: ICD-10-PCS | Mod: CPTII,S$GLB,, | Performed by: UROLOGY

## 2021-08-16 PROCEDURE — 99999 PR PBB SHADOW E&M-EST. PATIENT-LVL III: ICD-10-PCS | Mod: PBBFAC,,, | Performed by: UROLOGY

## 2021-08-16 PROCEDURE — 99214 OFFICE O/P EST MOD 30 MIN: CPT | Mod: S$GLB,,, | Performed by: UROLOGY

## 2021-08-16 PROCEDURE — 3288F FALL RISK ASSESSMENT DOCD: CPT | Mod: CPTII,S$GLB,, | Performed by: UROLOGY

## 2021-08-16 PROCEDURE — 3078F DIAST BP <80 MM HG: CPT | Mod: CPTII,S$GLB,, | Performed by: UROLOGY

## 2021-08-16 PROCEDURE — 3078F PR MOST RECENT DIASTOLIC BLOOD PRESSURE < 80 MM HG: ICD-10-PCS | Mod: CPTII,S$GLB,, | Performed by: UROLOGY

## 2021-08-16 PROCEDURE — 1126F AMNT PAIN NOTED NONE PRSNT: CPT | Mod: CPTII,S$GLB,, | Performed by: UROLOGY

## 2021-08-16 PROCEDURE — 1159F PR MEDICATION LIST DOCUMENTED IN MEDICAL RECORD: ICD-10-PCS | Mod: CPTII,S$GLB,, | Performed by: UROLOGY

## 2021-08-16 PROCEDURE — 1160F PR REVIEW ALL MEDS BY PRESCRIBER/CLIN PHARMACIST DOCUMENTED: ICD-10-PCS | Mod: CPTII,S$GLB,, | Performed by: UROLOGY

## 2021-08-16 RX ORDER — TAMSULOSIN HYDROCHLORIDE 0.4 MG/1
0.4 CAPSULE ORAL DAILY
Qty: 90 CAPSULE | Refills: 3 | Status: SHIPPED | OUTPATIENT
Start: 2021-08-16 | End: 2023-04-17 | Stop reason: SDUPTHER

## 2021-08-16 RX ORDER — TADALAFIL 20 MG/1
20 TABLET ORAL DAILY
Qty: 10 TABLET | Refills: 11 | Status: SHIPPED | OUTPATIENT
Start: 2021-08-16 | End: 2023-04-17 | Stop reason: ALTCHOICE

## 2021-08-17 ENCOUNTER — PATIENT MESSAGE (OUTPATIENT)
Dept: INTERNAL MEDICINE | Facility: CLINIC | Age: 66
End: 2021-08-17

## 2021-08-19 ENCOUNTER — HOSPITAL ENCOUNTER (OUTPATIENT)
Dept: RADIOLOGY | Facility: HOSPITAL | Age: 66
Discharge: HOME | End: 2021-08-19
Attending: FAMILY MEDICINE
Payer: MEDICARE

## 2021-08-19 ENCOUNTER — OFFICE VISIT (OUTPATIENT)
Dept: HEPATOLOGY | Facility: CLINIC | Age: 66
End: 2021-08-19
Payer: MEDICARE

## 2021-08-19 ENCOUNTER — PROCEDURE VISIT (OUTPATIENT)
Dept: HEPATOLOGY | Facility: CLINIC | Age: 66
End: 2021-08-19
Payer: MEDICARE

## 2021-08-19 VITALS
RESPIRATION RATE: 18 BRPM | SYSTOLIC BLOOD PRESSURE: 158 MMHG | HEART RATE: 61 BPM | BODY MASS INDEX: 30.01 KG/M2 | WEIGHT: 198 LBS | HEIGHT: 68 IN | OXYGEN SATURATION: 98 % | TEMPERATURE: 98 F | DIASTOLIC BLOOD PRESSURE: 78 MMHG

## 2021-08-19 DIAGNOSIS — R80.9 TYPE 2 DIABETES MELLITUS WITH MICROALBUMINURIA, WITHOUT LONG-TERM CURRENT USE OF INSULIN: ICD-10-CM

## 2021-08-19 DIAGNOSIS — R74.01 ELEVATED TRANSAMINASE LEVEL: ICD-10-CM

## 2021-08-19 DIAGNOSIS — E11.29 TYPE 2 DIABETES MELLITUS WITH MICROALBUMINURIA, WITHOUT LONG-TERM CURRENT USE OF INSULIN: ICD-10-CM

## 2021-08-19 DIAGNOSIS — E78.5 HYPERLIPIDEMIA ASSOCIATED WITH TYPE 2 DIABETES MELLITUS: ICD-10-CM

## 2021-08-19 DIAGNOSIS — K74.00 HEPATIC FIBROSIS: ICD-10-CM

## 2021-08-19 DIAGNOSIS — E66.09 CLASS 1 OBESITY DUE TO EXCESS CALORIES WITH SERIOUS COMORBIDITY AND BODY MASS INDEX (BMI) OF 30.0 TO 30.9 IN ADULT: ICD-10-CM

## 2021-08-19 DIAGNOSIS — K76.0 NAFLD (NONALCOHOLIC FATTY LIVER DISEASE): Primary | ICD-10-CM

## 2021-08-19 DIAGNOSIS — E11.59 HYPERTENSION ASSOCIATED WITH DIABETES: ICD-10-CM

## 2021-08-19 DIAGNOSIS — K76.9 LIVER LESION: ICD-10-CM

## 2021-08-19 DIAGNOSIS — E11.69 HYPERLIPIDEMIA ASSOCIATED WITH TYPE 2 DIABETES MELLITUS: ICD-10-CM

## 2021-08-19 DIAGNOSIS — I15.2 HYPERTENSION ASSOCIATED WITH DIABETES: ICD-10-CM

## 2021-08-19 PROCEDURE — 3008F PR BODY MASS INDEX (BMI) DOCUMENTED: ICD-10-PCS | Mod: CPTII,S$GLB,, | Performed by: NURSE PRACTITIONER

## 2021-08-19 PROCEDURE — 1126F AMNT PAIN NOTED NONE PRSNT: CPT | Mod: CPTII,S$GLB,, | Performed by: NURSE PRACTITIONER

## 2021-08-19 PROCEDURE — 1159F PR MEDICATION LIST DOCUMENTED IN MEDICAL RECORD: ICD-10-PCS | Mod: CPTII,S$GLB,, | Performed by: NURSE PRACTITIONER

## 2021-08-19 PROCEDURE — 99204 PR OFFICE/OUTPT VISIT, NEW, LEVL IV, 45-59 MIN: ICD-10-PCS | Mod: S$GLB,,, | Performed by: NURSE PRACTITIONER

## 2021-08-19 PROCEDURE — 3077F SYST BP >= 140 MM HG: CPT | Mod: CPTII,S$GLB,, | Performed by: NURSE PRACTITIONER

## 2021-08-19 PROCEDURE — 91200 LIVER ELASTOGRAPHY: CPT | Mod: S$GLB,,, | Performed by: NURSE PRACTITIONER

## 2021-08-19 PROCEDURE — 76700 US ABDOMEN COMPLETE: ICD-10-PCS | Mod: 26,,, | Performed by: RADIOLOGY

## 2021-08-19 PROCEDURE — 3078F PR MOST RECENT DIASTOLIC BLOOD PRESSURE < 80 MM HG: ICD-10-PCS | Mod: CPTII,S$GLB,, | Performed by: NURSE PRACTITIONER

## 2021-08-19 PROCEDURE — 91200 FIBROSCAN (VIBRATION CONTROLLED TRANSIENT ELASTOGRAPHY): ICD-10-PCS | Mod: S$GLB,,, | Performed by: NURSE PRACTITIONER

## 2021-08-19 PROCEDURE — 99499 UNLISTED E&M SERVICE: CPT | Mod: S$GLB,,, | Performed by: NURSE PRACTITIONER

## 2021-08-19 PROCEDURE — 1160F RVW MEDS BY RX/DR IN RCRD: CPT | Mod: CPTII,S$GLB,, | Performed by: NURSE PRACTITIONER

## 2021-08-19 PROCEDURE — 1160F PR REVIEW ALL MEDS BY PRESCRIBER/CLIN PHARMACIST DOCUMENTED: ICD-10-PCS | Mod: CPTII,S$GLB,, | Performed by: NURSE PRACTITIONER

## 2021-08-19 PROCEDURE — 1126F PR PAIN SEVERITY QUANTIFIED, NO PAIN PRESENT: ICD-10-PCS | Mod: CPTII,S$GLB,, | Performed by: NURSE PRACTITIONER

## 2021-08-19 PROCEDURE — 3288F FALL RISK ASSESSMENT DOCD: CPT | Mod: CPTII,S$GLB,, | Performed by: NURSE PRACTITIONER

## 2021-08-19 PROCEDURE — 3044F PR MOST RECENT HEMOGLOBIN A1C LEVEL <7.0%: ICD-10-PCS | Mod: CPTII,S$GLB,, | Performed by: NURSE PRACTITIONER

## 2021-08-19 PROCEDURE — 1101F PT FALLS ASSESS-DOCD LE1/YR: CPT | Mod: CPTII,S$GLB,, | Performed by: NURSE PRACTITIONER

## 2021-08-19 PROCEDURE — 3008F BODY MASS INDEX DOCD: CPT | Mod: CPTII,S$GLB,, | Performed by: NURSE PRACTITIONER

## 2021-08-19 PROCEDURE — 76700 US EXAM ABDOM COMPLETE: CPT | Mod: 26,,, | Performed by: RADIOLOGY

## 2021-08-19 PROCEDURE — 3288F PR FALLS RISK ASSESSMENT DOCUMENTED: ICD-10-PCS | Mod: CPTII,S$GLB,, | Performed by: NURSE PRACTITIONER

## 2021-08-19 PROCEDURE — 3044F HG A1C LEVEL LT 7.0%: CPT | Mod: CPTII,S$GLB,, | Performed by: NURSE PRACTITIONER

## 2021-08-19 PROCEDURE — 99999 PR PBB SHADOW E&M-EST. PATIENT-LVL V: ICD-10-PCS | Mod: PBBFAC,,, | Performed by: NURSE PRACTITIONER

## 2021-08-19 PROCEDURE — 99499 RISK ADDL DX/OHS AUDIT: ICD-10-PCS | Mod: S$GLB,,, | Performed by: NURSE PRACTITIONER

## 2021-08-19 PROCEDURE — 76700 US EXAM ABDOM COMPLETE: CPT | Mod: TC

## 2021-08-19 PROCEDURE — 99999 PR PBB SHADOW E&M-EST. PATIENT-LVL V: CPT | Mod: PBBFAC,,, | Performed by: NURSE PRACTITIONER

## 2021-08-19 PROCEDURE — 1159F MED LIST DOCD IN RCRD: CPT | Mod: CPTII,S$GLB,, | Performed by: NURSE PRACTITIONER

## 2021-08-19 PROCEDURE — 3077F PR MOST RECENT SYSTOLIC BLOOD PRESSURE >= 140 MM HG: ICD-10-PCS | Mod: CPTII,S$GLB,, | Performed by: NURSE PRACTITIONER

## 2021-08-19 PROCEDURE — 3078F DIAST BP <80 MM HG: CPT | Mod: CPTII,S$GLB,, | Performed by: NURSE PRACTITIONER

## 2021-08-19 PROCEDURE — 1101F PR PT FALLS ASSESS DOC 0-1 FALLS W/OUT INJ PAST YR: ICD-10-PCS | Mod: CPTII,S$GLB,, | Performed by: NURSE PRACTITIONER

## 2021-08-19 PROCEDURE — 99204 OFFICE O/P NEW MOD 45 MIN: CPT | Mod: S$GLB,,, | Performed by: NURSE PRACTITIONER

## 2021-09-13 ENCOUNTER — PATIENT MESSAGE (OUTPATIENT)
Dept: INTERNAL MEDICINE | Facility: CLINIC | Age: 66
End: 2021-09-13

## 2021-09-14 RX ORDER — TOPIRAMATE 100 MG/1
100 TABLET, FILM COATED ORAL DAILY
Qty: 30 TABLET | Refills: 2 | Status: SHIPPED | OUTPATIENT
Start: 2021-09-14 | End: 2021-11-12

## 2021-09-15 ENCOUNTER — TELEPHONE (OUTPATIENT)
Dept: HEPATOLOGY | Facility: CLINIC | Age: 66
End: 2021-09-15

## 2021-09-16 RX ORDER — DIAZEPAM 5 MG/1
5 TABLET ORAL
Qty: 1 TABLET | Refills: 0 | Status: SHIPPED | OUTPATIENT
Start: 2021-09-16 | End: 2021-09-27

## 2021-09-17 ENCOUNTER — TELEPHONE (OUTPATIENT)
Dept: INTERNAL MEDICINE | Facility: CLINIC | Age: 66
End: 2021-09-17

## 2021-09-20 ENCOUNTER — HOSPITAL ENCOUNTER (OUTPATIENT)
Dept: RADIOLOGY | Facility: HOSPITAL | Age: 66
Discharge: HOME | End: 2021-09-20
Attending: NURSE PRACTITIONER
Payer: MEDICARE

## 2021-09-20 DIAGNOSIS — K74.00 HEPATIC FIBROSIS: ICD-10-CM

## 2021-09-20 DIAGNOSIS — K76.9 LIVER LESION: ICD-10-CM

## 2021-09-20 DIAGNOSIS — K76.0 NAFLD (NONALCOHOLIC FATTY LIVER DISEASE): ICD-10-CM

## 2021-09-20 LAB
CREAT SERPL-MCNC: 1.2 MG/DL (ref 0.5–1.4)
SAMPLE: NORMAL

## 2021-09-20 PROCEDURE — 25500020 PHARM REV CODE 255: Performed by: NURSE PRACTITIONER

## 2021-09-20 PROCEDURE — 74183 MRI ABD W/O CNTR FLWD CNTR: CPT | Mod: 26,,, | Performed by: RADIOLOGY

## 2021-09-20 PROCEDURE — 74183 MRI ABD W/O CNTR FLWD CNTR: CPT | Mod: TC

## 2021-09-20 PROCEDURE — 76391 MR ELASTOGRAPHY: ICD-10-PCS | Mod: 26,,, | Performed by: RADIOLOGY

## 2021-09-20 PROCEDURE — 76391 MR ELASTOGRAPHY: CPT | Mod: 26,,, | Performed by: RADIOLOGY

## 2021-09-20 PROCEDURE — A9585 GADOBUTROL INJECTION: HCPCS | Performed by: NURSE PRACTITIONER

## 2021-09-20 PROCEDURE — 76391 MR ELASTOGRAPHY: CPT | Mod: TC

## 2021-09-20 PROCEDURE — 74183 MRI ABDOMEN W WO CONTRAST: ICD-10-PCS | Mod: 26,,, | Performed by: RADIOLOGY

## 2021-09-20 RX ORDER — GADOBUTROL 604.72 MG/ML
10 INJECTION INTRAVENOUS
Status: COMPLETED | OUTPATIENT
Start: 2021-09-20 | End: 2021-09-20

## 2021-09-20 RX ADMIN — GADOBUTROL 10 ML: 604.72 INJECTION INTRAVENOUS at 10:09

## 2021-09-24 ENCOUNTER — PATIENT OUTREACH (OUTPATIENT)
Dept: ADMINISTRATIVE | Facility: OTHER | Age: 66
End: 2021-09-24

## 2021-09-27 ENCOUNTER — PATIENT MESSAGE (OUTPATIENT)
Dept: INTERNAL MEDICINE | Facility: CLINIC | Age: 66
End: 2021-09-27

## 2021-09-27 ENCOUNTER — OFFICE VISIT (OUTPATIENT)
Dept: HEPATOLOGY | Facility: CLINIC | Age: 66
End: 2021-09-27
Payer: MEDICARE

## 2021-09-27 ENCOUNTER — TELEPHONE (OUTPATIENT)
Dept: INTERNAL MEDICINE | Facility: CLINIC | Age: 66
End: 2021-09-27

## 2021-09-27 VITALS
DIASTOLIC BLOOD PRESSURE: 77 MMHG | OXYGEN SATURATION: 95 % | RESPIRATION RATE: 18 BRPM | TEMPERATURE: 97 F | BODY MASS INDEX: 30.17 KG/M2 | SYSTOLIC BLOOD PRESSURE: 142 MMHG | HEIGHT: 68 IN | HEART RATE: 64 BPM | WEIGHT: 199.06 LBS

## 2021-09-27 DIAGNOSIS — E66.09 CLASS 1 OBESITY DUE TO EXCESS CALORIES WITH SERIOUS COMORBIDITY AND BODY MASS INDEX (BMI) OF 30.0 TO 30.9 IN ADULT: ICD-10-CM

## 2021-09-27 DIAGNOSIS — E11.69 HYPERLIPIDEMIA ASSOCIATED WITH TYPE 2 DIABETES MELLITUS: ICD-10-CM

## 2021-09-27 DIAGNOSIS — E11.59 HYPERTENSION ASSOCIATED WITH DIABETES: ICD-10-CM

## 2021-09-27 DIAGNOSIS — R80.9 TYPE 2 DIABETES MELLITUS WITH MICROALBUMINURIA, WITHOUT LONG-TERM CURRENT USE OF INSULIN: ICD-10-CM

## 2021-09-27 DIAGNOSIS — R74.01 ELEVATED TRANSAMINASE LEVEL: ICD-10-CM

## 2021-09-27 DIAGNOSIS — K74.00 HEPATIC FIBROSIS: ICD-10-CM

## 2021-09-27 DIAGNOSIS — Z23 NEED FOR HEPATITIS A AND B VACCINATION: ICD-10-CM

## 2021-09-27 DIAGNOSIS — I15.2 HYPERTENSION ASSOCIATED WITH DIABETES: ICD-10-CM

## 2021-09-27 DIAGNOSIS — E78.5 HYPERLIPIDEMIA ASSOCIATED WITH TYPE 2 DIABETES MELLITUS: ICD-10-CM

## 2021-09-27 DIAGNOSIS — K76.0 NAFLD (NONALCOHOLIC FATTY LIVER DISEASE): Primary | ICD-10-CM

## 2021-09-27 DIAGNOSIS — E11.29 TYPE 2 DIABETES MELLITUS WITH MICROALBUMINURIA, WITHOUT LONG-TERM CURRENT USE OF INSULIN: ICD-10-CM

## 2021-09-27 DIAGNOSIS — K76.9 LIVER LESION: ICD-10-CM

## 2021-09-27 PROCEDURE — 3288F FALL RISK ASSESSMENT DOCD: CPT | Mod: CPTII,S$GLB,, | Performed by: NURSE PRACTITIONER

## 2021-09-27 PROCEDURE — 3066F PR DOCUMENTATION OF TREATMENT FOR NEPHROPATHY: ICD-10-PCS | Mod: CPTII,S$GLB,, | Performed by: NURSE PRACTITIONER

## 2021-09-27 PROCEDURE — 99214 OFFICE O/P EST MOD 30 MIN: CPT | Mod: S$GLB,,, | Performed by: NURSE PRACTITIONER

## 2021-09-27 PROCEDURE — 3078F DIAST BP <80 MM HG: CPT | Mod: CPTII,S$GLB,, | Performed by: NURSE PRACTITIONER

## 2021-09-27 PROCEDURE — 1159F MED LIST DOCD IN RCRD: CPT | Mod: CPTII,S$GLB,, | Performed by: NURSE PRACTITIONER

## 2021-09-27 PROCEDURE — 1159F PR MEDICATION LIST DOCUMENTED IN MEDICAL RECORD: ICD-10-PCS | Mod: CPTII,S$GLB,, | Performed by: NURSE PRACTITIONER

## 2021-09-27 PROCEDURE — 3008F PR BODY MASS INDEX (BMI) DOCUMENTED: ICD-10-PCS | Mod: CPTII,S$GLB,, | Performed by: NURSE PRACTITIONER

## 2021-09-27 PROCEDURE — 3077F PR MOST RECENT SYSTOLIC BLOOD PRESSURE >= 140 MM HG: ICD-10-PCS | Mod: CPTII,S$GLB,, | Performed by: NURSE PRACTITIONER

## 2021-09-27 PROCEDURE — 4010F PR ACE/ARB THEARPY RXD/TAKEN: ICD-10-PCS | Mod: CPTII,S$GLB,, | Performed by: NURSE PRACTITIONER

## 2021-09-27 PROCEDURE — 99999 PR PBB SHADOW E&M-EST. PATIENT-LVL V: ICD-10-PCS | Mod: PBBFAC,,, | Performed by: NURSE PRACTITIONER

## 2021-09-27 PROCEDURE — 3060F POS MICROALBUMINURIA REV: CPT | Mod: CPTII,S$GLB,, | Performed by: NURSE PRACTITIONER

## 2021-09-27 PROCEDURE — 4010F ACE/ARB THERAPY RXD/TAKEN: CPT | Mod: CPTII,S$GLB,, | Performed by: NURSE PRACTITIONER

## 2021-09-27 PROCEDURE — 3066F NEPHROPATHY DOC TX: CPT | Mod: CPTII,S$GLB,, | Performed by: NURSE PRACTITIONER

## 2021-09-27 PROCEDURE — 3077F SYST BP >= 140 MM HG: CPT | Mod: CPTII,S$GLB,, | Performed by: NURSE PRACTITIONER

## 2021-09-27 PROCEDURE — 99999 PR PBB SHADOW E&M-EST. PATIENT-LVL V: CPT | Mod: PBBFAC,,, | Performed by: NURSE PRACTITIONER

## 2021-09-27 PROCEDURE — 3288F PR FALLS RISK ASSESSMENT DOCUMENTED: ICD-10-PCS | Mod: CPTII,S$GLB,, | Performed by: NURSE PRACTITIONER

## 2021-09-27 PROCEDURE — 3044F PR MOST RECENT HEMOGLOBIN A1C LEVEL <7.0%: ICD-10-PCS | Mod: CPTII,S$GLB,, | Performed by: NURSE PRACTITIONER

## 2021-09-27 PROCEDURE — 1101F PR PT FALLS ASSESS DOC 0-1 FALLS W/OUT INJ PAST YR: ICD-10-PCS | Mod: CPTII,S$GLB,, | Performed by: NURSE PRACTITIONER

## 2021-09-27 PROCEDURE — 1101F PT FALLS ASSESS-DOCD LE1/YR: CPT | Mod: CPTII,S$GLB,, | Performed by: NURSE PRACTITIONER

## 2021-09-27 PROCEDURE — 1126F PR PAIN SEVERITY QUANTIFIED, NO PAIN PRESENT: ICD-10-PCS | Mod: CPTII,S$GLB,, | Performed by: NURSE PRACTITIONER

## 2021-09-27 PROCEDURE — 1126F AMNT PAIN NOTED NONE PRSNT: CPT | Mod: CPTII,S$GLB,, | Performed by: NURSE PRACTITIONER

## 2021-09-27 PROCEDURE — 99214 PR OFFICE/OUTPT VISIT, EST, LEVL IV, 30-39 MIN: ICD-10-PCS | Mod: S$GLB,,, | Performed by: NURSE PRACTITIONER

## 2021-09-27 PROCEDURE — 3008F BODY MASS INDEX DOCD: CPT | Mod: CPTII,S$GLB,, | Performed by: NURSE PRACTITIONER

## 2021-09-27 PROCEDURE — 3060F PR POS MICROALBUMINURIA RESULT DOCUMENTED/REVIEW: ICD-10-PCS | Mod: CPTII,S$GLB,, | Performed by: NURSE PRACTITIONER

## 2021-09-27 PROCEDURE — 3044F HG A1C LEVEL LT 7.0%: CPT | Mod: CPTII,S$GLB,, | Performed by: NURSE PRACTITIONER

## 2021-09-27 PROCEDURE — 3078F PR MOST RECENT DIASTOLIC BLOOD PRESSURE < 80 MM HG: ICD-10-PCS | Mod: CPTII,S$GLB,, | Performed by: NURSE PRACTITIONER

## 2021-09-28 ENCOUNTER — PATIENT MESSAGE (OUTPATIENT)
Dept: INTERNAL MEDICINE | Facility: CLINIC | Age: 66
End: 2021-09-28

## 2021-09-28 DIAGNOSIS — G47.33 OSA ON CPAP: Primary | ICD-10-CM

## 2021-10-04 ENCOUNTER — OFFICE VISIT (OUTPATIENT)
Dept: SLEEP MEDICINE | Facility: CLINIC | Age: 66
End: 2021-10-04
Payer: MEDICARE

## 2021-10-04 VITALS
BODY MASS INDEX: 29.86 KG/M2 | WEIGHT: 197.06 LBS | SYSTOLIC BLOOD PRESSURE: 126 MMHG | HEIGHT: 68 IN | DIASTOLIC BLOOD PRESSURE: 74 MMHG | HEART RATE: 64 BPM

## 2021-10-04 DIAGNOSIS — G47.33 OSA ON CPAP: Primary | ICD-10-CM

## 2021-10-04 DIAGNOSIS — K76.0 NAFLD (NONALCOHOLIC FATTY LIVER DISEASE): ICD-10-CM

## 2021-10-04 DIAGNOSIS — E78.5 HYPERLIPIDEMIA ASSOCIATED WITH TYPE 2 DIABETES MELLITUS: ICD-10-CM

## 2021-10-04 DIAGNOSIS — R80.9 TYPE 2 DIABETES MELLITUS WITH MICROALBUMINURIA, WITHOUT LONG-TERM CURRENT USE OF INSULIN: ICD-10-CM

## 2021-10-04 DIAGNOSIS — E11.69 HYPERLIPIDEMIA ASSOCIATED WITH TYPE 2 DIABETES MELLITUS: ICD-10-CM

## 2021-10-04 DIAGNOSIS — E11.29 TYPE 2 DIABETES MELLITUS WITH MICROALBUMINURIA, WITHOUT LONG-TERM CURRENT USE OF INSULIN: ICD-10-CM

## 2021-10-04 DIAGNOSIS — I10 ESSENTIAL HYPERTENSION: ICD-10-CM

## 2021-10-04 PROCEDURE — 1101F PR PT FALLS ASSESS DOC 0-1 FALLS W/OUT INJ PAST YR: ICD-10-PCS | Mod: CPTII,S$GLB,, | Performed by: INTERNAL MEDICINE

## 2021-10-04 PROCEDURE — 3066F PR DOCUMENTATION OF TREATMENT FOR NEPHROPATHY: ICD-10-PCS | Mod: CPTII,S$GLB,, | Performed by: INTERNAL MEDICINE

## 2021-10-04 PROCEDURE — 99999 PR PBB SHADOW E&M-EST. PATIENT-LVL III: CPT | Mod: PBBFAC,,, | Performed by: INTERNAL MEDICINE

## 2021-10-04 PROCEDURE — 1126F AMNT PAIN NOTED NONE PRSNT: CPT | Mod: CPTII,S$GLB,, | Performed by: INTERNAL MEDICINE

## 2021-10-04 PROCEDURE — 1159F MED LIST DOCD IN RCRD: CPT | Mod: CPTII,S$GLB,, | Performed by: INTERNAL MEDICINE

## 2021-10-04 PROCEDURE — 3078F PR MOST RECENT DIASTOLIC BLOOD PRESSURE < 80 MM HG: ICD-10-PCS | Mod: CPTII,S$GLB,, | Performed by: INTERNAL MEDICINE

## 2021-10-04 PROCEDURE — 3044F HG A1C LEVEL LT 7.0%: CPT | Mod: CPTII,S$GLB,, | Performed by: INTERNAL MEDICINE

## 2021-10-04 PROCEDURE — 3060F PR POS MICROALBUMINURIA RESULT DOCUMENTED/REVIEW: ICD-10-PCS | Mod: CPTII,S$GLB,, | Performed by: INTERNAL MEDICINE

## 2021-10-04 PROCEDURE — 3044F PR MOST RECENT HEMOGLOBIN A1C LEVEL <7.0%: ICD-10-PCS | Mod: CPTII,S$GLB,, | Performed by: INTERNAL MEDICINE

## 2021-10-04 PROCEDURE — 4010F ACE/ARB THERAPY RXD/TAKEN: CPT | Mod: CPTII,S$GLB,, | Performed by: INTERNAL MEDICINE

## 2021-10-04 PROCEDURE — 3060F POS MICROALBUMINURIA REV: CPT | Mod: CPTII,S$GLB,, | Performed by: INTERNAL MEDICINE

## 2021-10-04 PROCEDURE — 99203 PR OFFICE/OUTPT VISIT, NEW, LEVL III, 30-44 MIN: ICD-10-PCS | Mod: S$GLB,,, | Performed by: INTERNAL MEDICINE

## 2021-10-04 PROCEDURE — 99999 PR PBB SHADOW E&M-EST. PATIENT-LVL III: ICD-10-PCS | Mod: PBBFAC,,, | Performed by: INTERNAL MEDICINE

## 2021-10-04 PROCEDURE — 3008F BODY MASS INDEX DOCD: CPT | Mod: CPTII,S$GLB,, | Performed by: INTERNAL MEDICINE

## 2021-10-04 PROCEDURE — 3288F PR FALLS RISK ASSESSMENT DOCUMENTED: ICD-10-PCS | Mod: CPTII,S$GLB,, | Performed by: INTERNAL MEDICINE

## 2021-10-04 PROCEDURE — 1159F PR MEDICATION LIST DOCUMENTED IN MEDICAL RECORD: ICD-10-PCS | Mod: CPTII,S$GLB,, | Performed by: INTERNAL MEDICINE

## 2021-10-04 PROCEDURE — 1160F RVW MEDS BY RX/DR IN RCRD: CPT | Mod: CPTII,S$GLB,, | Performed by: INTERNAL MEDICINE

## 2021-10-04 PROCEDURE — 1101F PT FALLS ASSESS-DOCD LE1/YR: CPT | Mod: CPTII,S$GLB,, | Performed by: INTERNAL MEDICINE

## 2021-10-04 PROCEDURE — 3066F NEPHROPATHY DOC TX: CPT | Mod: CPTII,S$GLB,, | Performed by: INTERNAL MEDICINE

## 2021-10-04 PROCEDURE — 4010F PR ACE/ARB THEARPY RXD/TAKEN: ICD-10-PCS | Mod: CPTII,S$GLB,, | Performed by: INTERNAL MEDICINE

## 2021-10-04 PROCEDURE — 3074F PR MOST RECENT SYSTOLIC BLOOD PRESSURE < 130 MM HG: ICD-10-PCS | Mod: CPTII,S$GLB,, | Performed by: INTERNAL MEDICINE

## 2021-10-04 PROCEDURE — 3074F SYST BP LT 130 MM HG: CPT | Mod: CPTII,S$GLB,, | Performed by: INTERNAL MEDICINE

## 2021-10-04 PROCEDURE — 99203 OFFICE O/P NEW LOW 30 MIN: CPT | Mod: S$GLB,,, | Performed by: INTERNAL MEDICINE

## 2021-10-04 PROCEDURE — 1126F PR PAIN SEVERITY QUANTIFIED, NO PAIN PRESENT: ICD-10-PCS | Mod: CPTII,S$GLB,, | Performed by: INTERNAL MEDICINE

## 2021-10-04 PROCEDURE — 3288F FALL RISK ASSESSMENT DOCD: CPT | Mod: CPTII,S$GLB,, | Performed by: INTERNAL MEDICINE

## 2021-10-04 PROCEDURE — 1160F PR REVIEW ALL MEDS BY PRESCRIBER/CLIN PHARMACIST DOCUMENTED: ICD-10-PCS | Mod: CPTII,S$GLB,, | Performed by: INTERNAL MEDICINE

## 2021-10-04 PROCEDURE — 3008F PR BODY MASS INDEX (BMI) DOCUMENTED: ICD-10-PCS | Mod: CPTII,S$GLB,, | Performed by: INTERNAL MEDICINE

## 2021-10-04 PROCEDURE — 3078F DIAST BP <80 MM HG: CPT | Mod: CPTII,S$GLB,, | Performed by: INTERNAL MEDICINE

## 2021-10-05 ENCOUNTER — PATIENT MESSAGE (OUTPATIENT)
Dept: HEPATOLOGY | Facility: CLINIC | Age: 66
End: 2021-10-05

## 2021-10-05 ENCOUNTER — TELEPHONE (OUTPATIENT)
Dept: HEPATOLOGY | Facility: CLINIC | Age: 66
End: 2021-10-05

## 2021-10-05 DIAGNOSIS — K76.9 LIVER LESION: ICD-10-CM

## 2021-10-05 DIAGNOSIS — K74.00 HEPATIC FIBROSIS: ICD-10-CM

## 2021-10-05 DIAGNOSIS — K76.0 NAFLD (NONALCOHOLIC FATTY LIVER DISEASE): Primary | ICD-10-CM

## 2021-10-14 ENCOUNTER — TELEPHONE (OUTPATIENT)
Dept: SLEEP MEDICINE | Facility: OTHER | Age: 66
End: 2021-10-14

## 2021-10-26 ENCOUNTER — HOSPITAL ENCOUNTER (OUTPATIENT)
Dept: SLEEP MEDICINE | Facility: OTHER | Age: 66
Discharge: HOME | End: 2021-10-26
Attending: INTERNAL MEDICINE
Payer: MEDICARE

## 2021-10-26 DIAGNOSIS — R80.9 TYPE 2 DIABETES MELLITUS WITH MICROALBUMINURIA, WITHOUT LONG-TERM CURRENT USE OF INSULIN: ICD-10-CM

## 2021-10-26 DIAGNOSIS — E11.69 HYPERLIPIDEMIA ASSOCIATED WITH TYPE 2 DIABETES MELLITUS: ICD-10-CM

## 2021-10-26 DIAGNOSIS — E78.5 HYPERLIPIDEMIA ASSOCIATED WITH TYPE 2 DIABETES MELLITUS: ICD-10-CM

## 2021-10-26 DIAGNOSIS — G47.33 OSA ON CPAP: ICD-10-CM

## 2021-10-26 DIAGNOSIS — E11.29 TYPE 2 DIABETES MELLITUS WITH MICROALBUMINURIA, WITHOUT LONG-TERM CURRENT USE OF INSULIN: ICD-10-CM

## 2021-10-26 DIAGNOSIS — K76.0 NAFLD (NONALCOHOLIC FATTY LIVER DISEASE): ICD-10-CM

## 2021-10-26 DIAGNOSIS — I10 ESSENTIAL HYPERTENSION: ICD-10-CM

## 2021-10-26 DIAGNOSIS — G47.33 OSA (OBSTRUCTIVE SLEEP APNEA): Primary | ICD-10-CM

## 2021-10-26 PROCEDURE — 95800 SLP STDY UNATTENDED: CPT

## 2021-10-26 PROCEDURE — 95800 PR SLEEP STUDY, UNATTENDED, RECORD HEART RATE/O2 SAT/RESP ANAL/SLEEP TIME: ICD-10-PCS | Mod: 26,,, | Performed by: INTERNAL MEDICINE

## 2021-10-26 PROCEDURE — 95800 SLP STDY UNATTENDED: CPT | Mod: 26,,, | Performed by: INTERNAL MEDICINE

## 2021-10-29 ENCOUNTER — TELEPHONE (OUTPATIENT)
Dept: HEPATOLOGY | Facility: CLINIC | Age: 66
End: 2021-10-29
Payer: MEDICARE

## 2021-11-11 ENCOUNTER — PATIENT OUTREACH (OUTPATIENT)
Dept: ADMINISTRATIVE | Facility: OTHER | Age: 66
End: 2021-11-11
Payer: MEDICARE

## 2021-11-12 ENCOUNTER — OFFICE VISIT (OUTPATIENT)
Dept: NEUROLOGY | Facility: CLINIC | Age: 66
End: 2021-11-12
Payer: MEDICARE

## 2021-11-12 VITALS
WEIGHT: 199.19 LBS | HEART RATE: 64 BPM | SYSTOLIC BLOOD PRESSURE: 128 MMHG | DIASTOLIC BLOOD PRESSURE: 74 MMHG | HEIGHT: 68 IN | BODY MASS INDEX: 30.19 KG/M2

## 2021-11-12 DIAGNOSIS — G89.29 CHRONIC NONINTRACTABLE HEADACHE, UNSPECIFIED HEADACHE TYPE: ICD-10-CM

## 2021-11-12 DIAGNOSIS — G43.009 MIGRAINE WITHOUT AURA AND WITHOUT STATUS MIGRAINOSUS, NOT INTRACTABLE: Primary | ICD-10-CM

## 2021-11-12 DIAGNOSIS — R51.9 CHRONIC NONINTRACTABLE HEADACHE, UNSPECIFIED HEADACHE TYPE: ICD-10-CM

## 2021-11-12 PROCEDURE — 99999 PR PBB SHADOW E&M-EST. PATIENT-LVL III: ICD-10-PCS | Mod: PBBFAC,,, | Performed by: PSYCHIATRY & NEUROLOGY

## 2021-11-12 PROCEDURE — 3078F PR MOST RECENT DIASTOLIC BLOOD PRESSURE < 80 MM HG: ICD-10-PCS | Mod: CPTII,S$GLB,, | Performed by: PSYCHIATRY & NEUROLOGY

## 2021-11-12 PROCEDURE — 1160F RVW MEDS BY RX/DR IN RCRD: CPT | Mod: CPTII,S$GLB,, | Performed by: PSYCHIATRY & NEUROLOGY

## 2021-11-12 PROCEDURE — 3074F PR MOST RECENT SYSTOLIC BLOOD PRESSURE < 130 MM HG: ICD-10-PCS | Mod: CPTII,S$GLB,, | Performed by: PSYCHIATRY & NEUROLOGY

## 2021-11-12 PROCEDURE — 1126F AMNT PAIN NOTED NONE PRSNT: CPT | Mod: CPTII,S$GLB,, | Performed by: PSYCHIATRY & NEUROLOGY

## 2021-11-12 PROCEDURE — 3288F PR FALLS RISK ASSESSMENT DOCUMENTED: ICD-10-PCS | Mod: CPTII,S$GLB,, | Performed by: PSYCHIATRY & NEUROLOGY

## 2021-11-12 PROCEDURE — 3044F HG A1C LEVEL LT 7.0%: CPT | Mod: CPTII,S$GLB,, | Performed by: PSYCHIATRY & NEUROLOGY

## 2021-11-12 PROCEDURE — 3078F DIAST BP <80 MM HG: CPT | Mod: CPTII,S$GLB,, | Performed by: PSYCHIATRY & NEUROLOGY

## 2021-11-12 PROCEDURE — 99999 PR PBB SHADOW E&M-EST. PATIENT-LVL III: CPT | Mod: PBBFAC,,, | Performed by: PSYCHIATRY & NEUROLOGY

## 2021-11-12 PROCEDURE — 1101F PT FALLS ASSESS-DOCD LE1/YR: CPT | Mod: CPTII,S$GLB,, | Performed by: PSYCHIATRY & NEUROLOGY

## 2021-11-12 PROCEDURE — 3074F SYST BP LT 130 MM HG: CPT | Mod: CPTII,S$GLB,, | Performed by: PSYCHIATRY & NEUROLOGY

## 2021-11-12 PROCEDURE — 1126F PR PAIN SEVERITY QUANTIFIED, NO PAIN PRESENT: ICD-10-PCS | Mod: CPTII,S$GLB,, | Performed by: PSYCHIATRY & NEUROLOGY

## 2021-11-12 PROCEDURE — 1159F PR MEDICATION LIST DOCUMENTED IN MEDICAL RECORD: ICD-10-PCS | Mod: CPTII,S$GLB,, | Performed by: PSYCHIATRY & NEUROLOGY

## 2021-11-12 PROCEDURE — 3066F NEPHROPATHY DOC TX: CPT | Mod: CPTII,S$GLB,, | Performed by: PSYCHIATRY & NEUROLOGY

## 2021-11-12 PROCEDURE — 99499 UNLISTED E&M SERVICE: CPT | Mod: S$GLB,,, | Performed by: PSYCHIATRY & NEUROLOGY

## 2021-11-12 PROCEDURE — 3060F POS MICROALBUMINURIA REV: CPT | Mod: CPTII,S$GLB,, | Performed by: PSYCHIATRY & NEUROLOGY

## 2021-11-12 PROCEDURE — 3044F PR MOST RECENT HEMOGLOBIN A1C LEVEL <7.0%: ICD-10-PCS | Mod: CPTII,S$GLB,, | Performed by: PSYCHIATRY & NEUROLOGY

## 2021-11-12 PROCEDURE — 3066F PR DOCUMENTATION OF TREATMENT FOR NEPHROPATHY: ICD-10-PCS | Mod: CPTII,S$GLB,, | Performed by: PSYCHIATRY & NEUROLOGY

## 2021-11-12 PROCEDURE — 99204 OFFICE O/P NEW MOD 45 MIN: CPT | Mod: S$GLB,,, | Performed by: PSYCHIATRY & NEUROLOGY

## 2021-11-12 PROCEDURE — 1101F PR PT FALLS ASSESS DOC 0-1 FALLS W/OUT INJ PAST YR: ICD-10-PCS | Mod: CPTII,S$GLB,, | Performed by: PSYCHIATRY & NEUROLOGY

## 2021-11-12 PROCEDURE — 4010F ACE/ARB THERAPY RXD/TAKEN: CPT | Mod: CPTII,S$GLB,, | Performed by: PSYCHIATRY & NEUROLOGY

## 2021-11-12 PROCEDURE — 4010F PR ACE/ARB THEARPY RXD/TAKEN: ICD-10-PCS | Mod: CPTII,S$GLB,, | Performed by: PSYCHIATRY & NEUROLOGY

## 2021-11-12 PROCEDURE — 1159F MED LIST DOCD IN RCRD: CPT | Mod: CPTII,S$GLB,, | Performed by: PSYCHIATRY & NEUROLOGY

## 2021-11-12 PROCEDURE — 3288F FALL RISK ASSESSMENT DOCD: CPT | Mod: CPTII,S$GLB,, | Performed by: PSYCHIATRY & NEUROLOGY

## 2021-11-12 PROCEDURE — 3008F BODY MASS INDEX DOCD: CPT | Mod: CPTII,S$GLB,, | Performed by: PSYCHIATRY & NEUROLOGY

## 2021-11-12 PROCEDURE — 3008F PR BODY MASS INDEX (BMI) DOCUMENTED: ICD-10-PCS | Mod: CPTII,S$GLB,, | Performed by: PSYCHIATRY & NEUROLOGY

## 2021-11-12 PROCEDURE — 99204 PR OFFICE/OUTPT VISIT, NEW, LEVL IV, 45-59 MIN: ICD-10-PCS | Mod: S$GLB,,, | Performed by: PSYCHIATRY & NEUROLOGY

## 2021-11-12 PROCEDURE — 99499 RISK ADDL DX/OHS AUDIT: ICD-10-PCS | Mod: S$GLB,,, | Performed by: PSYCHIATRY & NEUROLOGY

## 2021-11-12 PROCEDURE — 1160F PR REVIEW ALL MEDS BY PRESCRIBER/CLIN PHARMACIST DOCUMENTED: ICD-10-PCS | Mod: CPTII,S$GLB,, | Performed by: PSYCHIATRY & NEUROLOGY

## 2021-11-12 PROCEDURE — 3060F PR POS MICROALBUMINURIA RESULT DOCUMENTED/REVIEW: ICD-10-PCS | Mod: CPTII,S$GLB,, | Performed by: PSYCHIATRY & NEUROLOGY

## 2021-11-12 RX ORDER — TOPIRAMATE 50 MG/1
150 TABLET, FILM COATED ORAL DAILY
Qty: 90 TABLET | Refills: 11 | Status: SHIPPED | OUTPATIENT
Start: 2021-11-12 | End: 2023-04-26

## 2021-12-03 ENCOUNTER — OFFICE VISIT (OUTPATIENT)
Dept: INTERNAL MEDICINE | Facility: CLINIC | Age: 66
End: 2021-12-03
Payer: MEDICARE

## 2021-12-03 ENCOUNTER — LAB VISIT (OUTPATIENT)
Dept: LAB | Facility: HOSPITAL | Age: 66
End: 2021-12-03
Attending: FAMILY MEDICINE
Payer: MEDICARE

## 2021-12-03 VITALS
SYSTOLIC BLOOD PRESSURE: 118 MMHG | WEIGHT: 199.75 LBS | BODY MASS INDEX: 30.27 KG/M2 | OXYGEN SATURATION: 99 % | HEART RATE: 56 BPM | DIASTOLIC BLOOD PRESSURE: 68 MMHG | HEIGHT: 68 IN

## 2021-12-03 DIAGNOSIS — E11.29 TYPE 2 DIABETES MELLITUS WITH MICROALBUMINURIA, WITHOUT LONG-TERM CURRENT USE OF INSULIN: Primary | ICD-10-CM

## 2021-12-03 DIAGNOSIS — E11.69 HYPERLIPIDEMIA ASSOCIATED WITH TYPE 2 DIABETES MELLITUS: ICD-10-CM

## 2021-12-03 DIAGNOSIS — R80.9 TYPE 2 DIABETES MELLITUS WITH MICROALBUMINURIA, WITHOUT LONG-TERM CURRENT USE OF INSULIN: Primary | ICD-10-CM

## 2021-12-03 DIAGNOSIS — H40.9 GLAUCOMA, UNSPECIFIED GLAUCOMA TYPE, UNSPECIFIED LATERALITY: ICD-10-CM

## 2021-12-03 DIAGNOSIS — E11.9 TYPE 2 DIABETES MELLITUS WITHOUT COMPLICATION, WITHOUT LONG-TERM CURRENT USE OF INSULIN: ICD-10-CM

## 2021-12-03 DIAGNOSIS — I10 ESSENTIAL HYPERTENSION: ICD-10-CM

## 2021-12-03 DIAGNOSIS — E78.5 HYPERLIPIDEMIA ASSOCIATED WITH TYPE 2 DIABETES MELLITUS: ICD-10-CM

## 2021-12-03 DIAGNOSIS — H04.129 DRY EYE: ICD-10-CM

## 2021-12-03 PROBLEM — T38.3X5A METFORMIN ADVERSE REACTION: Status: RESOLVED | Noted: 2021-07-30 | Resolved: 2021-12-03

## 2021-12-03 LAB
CHOLEST SERPL-MCNC: 125 MG/DL (ref 120–199)
CHOLEST/HDLC SERPL: 2.5 {RATIO} (ref 2–5)
ESTIMATED AVG GLUCOSE: 143 MG/DL (ref 68–131)
HBA1C MFR BLD: 6.6 % (ref 4–5.6)
HDLC SERPL-MCNC: 50 MG/DL (ref 40–75)
HDLC SERPL: 40 % (ref 20–50)
LDLC SERPL CALC-MCNC: 63.2 MG/DL (ref 63–159)
NONHDLC SERPL-MCNC: 75 MG/DL
TRIGL SERPL-MCNC: 59 MG/DL (ref 30–150)

## 2021-12-03 PROCEDURE — 80061 LIPID PANEL: CPT | Performed by: FAMILY MEDICINE

## 2021-12-03 PROCEDURE — 99214 PR OFFICE/OUTPT VISIT, EST, LEVL IV, 30-39 MIN: ICD-10-PCS | Mod: S$GLB,,, | Performed by: FAMILY MEDICINE

## 2021-12-03 PROCEDURE — 4010F ACE/ARB THERAPY RXD/TAKEN: CPT | Mod: CPTII,S$GLB,, | Performed by: FAMILY MEDICINE

## 2021-12-03 PROCEDURE — 99999 PR PBB SHADOW E&M-EST. PATIENT-LVL IV: ICD-10-PCS | Mod: PBBFAC,,, | Performed by: FAMILY MEDICINE

## 2021-12-03 PROCEDURE — 3060F POS MICROALBUMINURIA REV: CPT | Mod: CPTII,S$GLB,, | Performed by: FAMILY MEDICINE

## 2021-12-03 PROCEDURE — 36415 COLL VENOUS BLD VENIPUNCTURE: CPT | Performed by: FAMILY MEDICINE

## 2021-12-03 PROCEDURE — 3066F PR DOCUMENTATION OF TREATMENT FOR NEPHROPATHY: ICD-10-PCS | Mod: CPTII,S$GLB,, | Performed by: FAMILY MEDICINE

## 2021-12-03 PROCEDURE — 4010F PR ACE/ARB THEARPY RXD/TAKEN: ICD-10-PCS | Mod: CPTII,S$GLB,, | Performed by: FAMILY MEDICINE

## 2021-12-03 PROCEDURE — 3060F PR POS MICROALBUMINURIA RESULT DOCUMENTED/REVIEW: ICD-10-PCS | Mod: CPTII,S$GLB,, | Performed by: FAMILY MEDICINE

## 2021-12-03 PROCEDURE — 3066F NEPHROPATHY DOC TX: CPT | Mod: CPTII,S$GLB,, | Performed by: FAMILY MEDICINE

## 2021-12-03 PROCEDURE — 99999 PR PBB SHADOW E&M-EST. PATIENT-LVL IV: CPT | Mod: PBBFAC,,, | Performed by: FAMILY MEDICINE

## 2021-12-03 PROCEDURE — 83036 HEMOGLOBIN GLYCOSYLATED A1C: CPT | Performed by: FAMILY MEDICINE

## 2021-12-03 PROCEDURE — 99214 OFFICE O/P EST MOD 30 MIN: CPT | Mod: S$GLB,,, | Performed by: FAMILY MEDICINE

## 2021-12-03 RX ORDER — ATORVASTATIN CALCIUM 20 MG/1
20 TABLET, FILM COATED ORAL DAILY
Qty: 90 TABLET | Refills: 3 | Status: SHIPPED | OUTPATIENT
Start: 2021-12-03 | End: 2023-09-27

## 2021-12-03 RX ORDER — METFORMIN HYDROCHLORIDE 500 MG/1
500 TABLET, EXTENDED RELEASE ORAL
Qty: 90 TABLET | Refills: 3 | Status: SHIPPED | OUTPATIENT
Start: 2021-12-03 | End: 2023-04-26

## 2021-12-10 ENCOUNTER — PATIENT MESSAGE (OUTPATIENT)
Dept: NEUROLOGY | Facility: CLINIC | Age: 66
End: 2021-12-10
Payer: MEDICARE

## 2021-12-21 ENCOUNTER — PATIENT MESSAGE (OUTPATIENT)
Dept: SLEEP MEDICINE | Facility: CLINIC | Age: 66
End: 2021-12-21
Payer: MEDICARE

## 2021-12-22 ENCOUNTER — PATIENT MESSAGE (OUTPATIENT)
Dept: SLEEP MEDICINE | Facility: CLINIC | Age: 66
End: 2021-12-22
Payer: MEDICARE

## 2022-01-05 ENCOUNTER — PATIENT MESSAGE (OUTPATIENT)
Dept: SLEEP MEDICINE | Facility: CLINIC | Age: 67
End: 2022-01-05
Payer: MEDICARE

## 2022-02-04 ENCOUNTER — PES CALL (OUTPATIENT)
Dept: ADMINISTRATIVE | Facility: CLINIC | Age: 67
End: 2022-02-04
Payer: MEDICARE

## 2022-02-08 ENCOUNTER — TELEPHONE (OUTPATIENT)
Dept: ADMINISTRATIVE | Facility: CLINIC | Age: 67
End: 2022-02-08
Payer: MEDICARE

## 2022-02-09 ENCOUNTER — TELEPHONE (OUTPATIENT)
Dept: ADMINISTRATIVE | Facility: CLINIC | Age: 67
End: 2022-02-09
Payer: MEDICARE

## 2022-02-10 ENCOUNTER — TELEPHONE (OUTPATIENT)
Dept: ADMINISTRATIVE | Facility: CLINIC | Age: 67
End: 2022-02-10
Payer: MEDICARE

## 2022-02-10 ENCOUNTER — OFFICE VISIT (OUTPATIENT)
Dept: HOME HEALTH SERVICES | Facility: CLINIC | Age: 67
End: 2022-02-10
Payer: MEDICARE

## 2022-02-10 DIAGNOSIS — E66.09 CLASS 1 OBESITY DUE TO EXCESS CALORIES WITH SERIOUS COMORBIDITY AND BODY MASS INDEX (BMI) OF 30.0 TO 30.9 IN ADULT: ICD-10-CM

## 2022-02-10 DIAGNOSIS — E11.29 MICROALBUMINURIA DUE TO TYPE 2 DIABETES MELLITUS: ICD-10-CM

## 2022-02-10 DIAGNOSIS — G47.33 OSA (OBSTRUCTIVE SLEEP APNEA): ICD-10-CM

## 2022-02-10 DIAGNOSIS — H40.9 GLAUCOMA, UNSPECIFIED GLAUCOMA TYPE, UNSPECIFIED LATERALITY: ICD-10-CM

## 2022-02-10 DIAGNOSIS — E66.9 TYPE 2 DIABETES MELLITUS WITH OBESITY: ICD-10-CM

## 2022-02-10 DIAGNOSIS — N52.01 ERECTILE DYSFUNCTION DUE TO ARTERIAL INSUFFICIENCY: ICD-10-CM

## 2022-02-10 DIAGNOSIS — H04.129 DRY EYE: ICD-10-CM

## 2022-02-10 DIAGNOSIS — K74.00 HEPATIC FIBROSIS: ICD-10-CM

## 2022-02-10 DIAGNOSIS — E11.69 HYPERLIPIDEMIA ASSOCIATED WITH TYPE 2 DIABETES MELLITUS: ICD-10-CM

## 2022-02-10 DIAGNOSIS — K76.0 NAFLD (NONALCOHOLIC FATTY LIVER DISEASE): ICD-10-CM

## 2022-02-10 DIAGNOSIS — E11.69 TYPE 2 DIABETES MELLITUS WITH OBESITY: ICD-10-CM

## 2022-02-10 DIAGNOSIS — R80.9 TYPE 2 DIABETES MELLITUS WITH MICROALBUMINURIA, WITHOUT LONG-TERM CURRENT USE OF INSULIN: ICD-10-CM

## 2022-02-10 DIAGNOSIS — I10 ESSENTIAL HYPERTENSION: ICD-10-CM

## 2022-02-10 DIAGNOSIS — E11.29 TYPE 2 DIABETES MELLITUS WITH MICROALBUMINURIA, WITHOUT LONG-TERM CURRENT USE OF INSULIN: ICD-10-CM

## 2022-02-10 DIAGNOSIS — I15.2 HYPERTENSION ASSOCIATED WITH DIABETES: ICD-10-CM

## 2022-02-10 DIAGNOSIS — E78.5 HYPERLIPIDEMIA ASSOCIATED WITH TYPE 2 DIABETES MELLITUS: ICD-10-CM

## 2022-02-10 DIAGNOSIS — N13.8 BPH WITH OBSTRUCTION/LOWER URINARY TRACT SYMPTOMS: ICD-10-CM

## 2022-02-10 DIAGNOSIS — E11.59 HYPERTENSION ASSOCIATED WITH DIABETES: ICD-10-CM

## 2022-02-10 DIAGNOSIS — Z00.00 ENCOUNTER FOR PREVENTIVE HEALTH EXAMINATION: Primary | ICD-10-CM

## 2022-02-10 DIAGNOSIS — Z78.9 ACE INHIBITOR INTOLERANCE: ICD-10-CM

## 2022-02-10 DIAGNOSIS — R74.01 ELEVATED TRANSAMINASE LEVEL: ICD-10-CM

## 2022-02-10 DIAGNOSIS — R80.9 MICROALBUMINURIA DUE TO TYPE 2 DIABETES MELLITUS: ICD-10-CM

## 2022-02-10 DIAGNOSIS — N40.1 BPH WITH OBSTRUCTION/LOWER URINARY TRACT SYMPTOMS: ICD-10-CM

## 2022-02-10 DIAGNOSIS — G43.009 MIGRAINE WITHOUT AURA AND WITHOUT STATUS MIGRAINOSUS, NOT INTRACTABLE: ICD-10-CM

## 2022-02-10 PROCEDURE — G0439 PPPS, SUBSEQ VISIT: HCPCS | Mod: 95,,, | Performed by: NURSE PRACTITIONER

## 2022-02-10 PROCEDURE — G0439 PR MEDICARE ANNUAL WELLNESS SUBSEQUENT VISIT: ICD-10-PCS | Mod: 95,,, | Performed by: NURSE PRACTITIONER

## 2022-02-10 PROCEDURE — 1159F PR MEDICATION LIST DOCUMENTED IN MEDICAL RECORD: ICD-10-PCS | Mod: CPTII,S$GLB,, | Performed by: NURSE PRACTITIONER

## 2022-02-10 PROCEDURE — 3288F FALL RISK ASSESSMENT DOCD: CPT | Mod: CPTII,S$GLB,, | Performed by: NURSE PRACTITIONER

## 2022-02-10 PROCEDURE — 99499 RISK ADDL DX/OHS AUDIT: ICD-10-PCS | Mod: 95,,, | Performed by: NURSE PRACTITIONER

## 2022-02-10 PROCEDURE — 99499 UNLISTED E&M SERVICE: CPT | Mod: 95,,, | Performed by: NURSE PRACTITIONER

## 2022-02-10 PROCEDURE — 1160F RVW MEDS BY RX/DR IN RCRD: CPT | Mod: CPTII,S$GLB,, | Performed by: NURSE PRACTITIONER

## 2022-02-10 PROCEDURE — 3288F PR FALLS RISK ASSESSMENT DOCUMENTED: ICD-10-PCS | Mod: CPTII,S$GLB,, | Performed by: NURSE PRACTITIONER

## 2022-02-10 PROCEDURE — 1170F PR FUNCTIONAL STATUS ASSESSED: ICD-10-PCS | Mod: CPTII,S$GLB,, | Performed by: NURSE PRACTITIONER

## 2022-02-10 PROCEDURE — 1170F FXNL STATUS ASSESSED: CPT | Mod: CPTII,S$GLB,, | Performed by: NURSE PRACTITIONER

## 2022-02-10 PROCEDURE — 1160F PR REVIEW ALL MEDS BY PRESCRIBER/CLIN PHARMACIST DOCUMENTED: ICD-10-PCS | Mod: CPTII,S$GLB,, | Performed by: NURSE PRACTITIONER

## 2022-02-10 PROCEDURE — 1101F PR PT FALLS ASSESS DOC 0-1 FALLS W/OUT INJ PAST YR: ICD-10-PCS | Mod: CPTII,S$GLB,, | Performed by: NURSE PRACTITIONER

## 2022-02-10 PROCEDURE — 1101F PT FALLS ASSESS-DOCD LE1/YR: CPT | Mod: CPTII,S$GLB,, | Performed by: NURSE PRACTITIONER

## 2022-02-10 PROCEDURE — 1159F MED LIST DOCD IN RCRD: CPT | Mod: CPTII,S$GLB,, | Performed by: NURSE PRACTITIONER

## 2022-02-10 NOTE — PATIENT INSTRUCTIONS
Counseling and Referral of Other Preventative  (Italic type indicates deductible and co-insurance are waived)    Patient Name: Clement Sweet  Today's Date: 2/10/2022    Health Maintenance       Date Due Completion Date    Pneumococcal Vaccines (Age 65+) (1 of 2 - PPSV23) Never done ---    TETANUS VACCINE 04/30/2022 (Originally 2/18/1973) ---    Shingles Vaccine (1 of 2) 04/30/2022 (Originally 2/18/2005) ---    PROSTATE-SPECIFIC ANTIGEN 04/07/2022 4/7/2021    Eye Exam 04/16/2022 4/16/2021 (Done)    Override on 4/16/2021: Done    Diabetes Urine Screening 04/30/2022 4/30/2021    Foot Exam 04/30/2022 4/30/2021    Override on 4/30/2021: Done    Hemoglobin A1c 06/03/2022 12/3/2021    Low Dose Statin 12/03/2022 12/3/2021    Lipid Panel 12/03/2022 12/3/2021    Colorectal Cancer Screening 04/02/2028 4/2/2018 (Done)    Override on 4/2/2018: Done        No orders of the defined types were placed in this encounter.    The following information is provided to all patients.  This information is to help you find resources for any of the problems found today that may be affecting your health:                Living healthy guide: www.[facility].[place].gov      Understanding Diabetes: www.diabetes.org      Eating healthy: www.cdc.gov/healthyweight      CDC home safety checklist: www.cdc.gov/steadi/patient.html      Agency on Aging: www.goea.[place].[facility]      Alcoholics anonymous (AA): www.aa.org      Physical Activity: www.tanner.nih.gov/wt2hnjm      Tobacco use: www.quitwithusla.org

## 2022-02-10 NOTE — PROGRESS NOTES
The patient location is: [place]  The chief complaint leading to consultation is: awv    Visit type: audiovisual    Face to Face time with patient: 60   minutes of total time spent on the encounter, which includes face to face time and non-face to face time preparing to see the patient (eg, review of tests), Obtaining and/or reviewing separately obtained history, Documenting clinical information in the electronic or other health record, Independently interpreting results (not separately reported) and communicating results to the patient/family/caregiver, or Care coordination (not separately reported).         Each patient to whom he or she provides medical services by telemedicine is:  (1) informed of the relationship between the physician and patient and the respective role of any other health care provider with respect to management of the patient; and (2) notified that he or she may decline to receive medical services by telemedicine and may withdraw from such care at any time.    Notes:       Clement Sweet presented for a  Medicare AWV and comprehensive Health Risk Assessment today. The following components were reviewed and updated:    · Medical history  · Family History  · Social history  · Allergies and Current Medications  · Health Risk Assessment  · Health Maintenance  · Care Team         ** See Completed Assessments for Annual Wellness Visit within the encounter summary.**         The following assessments were completed:  · Living Situation  · CAGE  · Depression Screening  · Fall Risk Assessment (MACH 10)  · Hearing Assessment(HHI)  · Cognitive Function Screening  · Nutrition Screening  · ADL Screening  · PAQ Screening      There were no vitals filed for this visit.  There is no height or weight on file to calculate BMI.  Physical Exam  Constitutional:       Appearance: Normal appearance.   Neurological:      Mental Status: He is alert.   Psychiatric:         Attention and Perception: Attention and  perception normal.         Mood and Affect: Mood and affect normal.         Speech: Speech normal.         Behavior: Behavior normal. Behavior is cooperative.         Thought Content: Thought content normal.         Cognition and Memory: Cognition normal. He exhibits impaired recent memory.         Judgment: Judgment normal.               Diagnoses and health risks identified today and associated recommendations/orders:    1. Hyperlipidemia associated with type 2 diabetes mellitus  Stable, followed by provider.    2. Encounter for preventive health examination  Stable, followed by provider.    3. Migraine without aura and without status migrainosus, not intractable  Stable, followed by provider.    4. Glaucoma, unspecified glaucoma type, unspecified laterality  Stable, followed by provider.    5. Dry eye  Stable, followed by provider.    6. Essential hypertension  Stable, followed by provider.    7. Hypertension associated with diabetes  Stable, followed by provider.    8. BPH with obstruction/lower urinary tract symptoms  Stable, followed by provider.    9. Erectile dysfunction due to arterial insufficiency  Stable, followed by provider.    10. Type 2 diabetes mellitus with microalbuminuria, without long-term current use of insulin  Stable, followed by provider.    11. Class 1 obesity due to excess calories with serious comorbidity and body mass index (BMI) of 30.0 to 30.9 in adult  Stable, followed by provider.    12. Microalbuminuria due to type 2 diabetes mellitus  Stable, followed by provider.    13. Type 2 diabetes mellitus with obesity  Stable, followed by provider.    14. Elevated transaminase level  Stable, followed by provider.    15. NAFLD (nonalcoholic fatty liver disease)  Stable, followed by provider.    16. Hepatic fibrosis  Stable, followed by provider.    17. ACE inhibitor intolerance  Stable, followed by provider.    18. AUTUMN (obstructive sleep apnea)  Stable, followed by provider.      Provided  Clement with a 5-10 year written screening schedule and personal prevention plan. Recommendations were developed using the USPSTF age appropriate recommendations. Education, counseling, and referrals were provided as needed. After Visit Summary printed and given to patient which includes a list of additional screenings\tests needed.    Follow up in about 1 year (around 2/10/2023) for awv.    Austin Rodriguez, NP  I offered to discuss advanced care planning, including how to pick a person who would make decisions for you if you were unable to make them for yourself, called a health care power of , and what kind of decisions you might make such as use of life sustaining treatments such as ventilators and tube feeding when faced with a life limiting illness recorded on a living will that they will need to know. (How you want to be cared for as you near the end of your natural life)     X  Patient has advanced directives on file, which we reviewed, and they do not wish to make changes.

## 2022-03-28 ENCOUNTER — PATIENT MESSAGE (OUTPATIENT)
Dept: INTERNAL MEDICINE | Facility: CLINIC | Age: 67
End: 2022-03-28
Payer: MEDICARE

## 2022-03-28 ENCOUNTER — HOSPITAL ENCOUNTER (OUTPATIENT)
Dept: RADIOLOGY | Facility: HOSPITAL | Age: 67
Discharge: HOME | End: 2022-03-28
Attending: NURSE PRACTITIONER
Payer: MEDICARE

## 2022-03-28 ENCOUNTER — PATIENT MESSAGE (OUTPATIENT)
Dept: PHARMACY | Facility: CLINIC | Age: 67
End: 2022-03-28
Payer: MEDICARE

## 2022-03-28 ENCOUNTER — TELEPHONE (OUTPATIENT)
Dept: HEPATOLOGY | Facility: CLINIC | Age: 67
End: 2022-03-28
Payer: MEDICARE

## 2022-03-28 DIAGNOSIS — K74.00 HEPATIC FIBROSIS: ICD-10-CM

## 2022-03-28 DIAGNOSIS — K76.9 LIVER LESION: ICD-10-CM

## 2022-03-28 DIAGNOSIS — R74.01 ELEVATED TRANSAMINASE LEVEL: ICD-10-CM

## 2022-03-28 DIAGNOSIS — K76.0 NAFLD (NONALCOHOLIC FATTY LIVER DISEASE): Primary | ICD-10-CM

## 2022-03-28 DIAGNOSIS — K76.0 NAFLD (NONALCOHOLIC FATTY LIVER DISEASE): ICD-10-CM

## 2022-03-28 PROCEDURE — 76705 ECHO EXAM OF ABDOMEN: CPT | Mod: 26,,, | Performed by: RADIOLOGY

## 2022-03-28 PROCEDURE — 76705 ECHO EXAM OF ABDOMEN: CPT | Mod: TC

## 2022-03-28 PROCEDURE — 76705 US ABDOMEN LIMITED: ICD-10-PCS | Mod: 26,,, | Performed by: RADIOLOGY

## 2022-04-26 ENCOUNTER — LAB VISIT (OUTPATIENT)
Dept: LAB | Facility: HOSPITAL | Age: 67
End: 2022-04-26
Payer: MEDICARE

## 2022-04-26 ENCOUNTER — OFFICE VISIT (OUTPATIENT)
Dept: UROLOGY | Facility: CLINIC | Age: 67
End: 2022-04-26
Payer: MEDICARE

## 2022-04-26 VITALS
BODY MASS INDEX: 30.84 KG/M2 | DIASTOLIC BLOOD PRESSURE: 82 MMHG | WEIGHT: 203.5 LBS | SYSTOLIC BLOOD PRESSURE: 165 MMHG | HEART RATE: 78 BPM | HEIGHT: 68 IN

## 2022-04-26 DIAGNOSIS — N40.1 BPH WITH OBSTRUCTION/LOWER URINARY TRACT SYMPTOMS: ICD-10-CM

## 2022-04-26 DIAGNOSIS — N40.1 BPH WITH OBSTRUCTION/LOWER URINARY TRACT SYMPTOMS: Primary | ICD-10-CM

## 2022-04-26 DIAGNOSIS — N13.8 BPH WITH OBSTRUCTION/LOWER URINARY TRACT SYMPTOMS: Primary | ICD-10-CM

## 2022-04-26 DIAGNOSIS — N13.8 BPH WITH OBSTRUCTION/LOWER URINARY TRACT SYMPTOMS: ICD-10-CM

## 2022-04-26 DIAGNOSIS — E11.29 TYPE 2 DIABETES MELLITUS WITH MICROALBUMINURIA, WITHOUT LONG-TERM CURRENT USE OF INSULIN: ICD-10-CM

## 2022-04-26 DIAGNOSIS — R80.9 TYPE 2 DIABETES MELLITUS WITH MICROALBUMINURIA, WITHOUT LONG-TERM CURRENT USE OF INSULIN: ICD-10-CM

## 2022-04-26 LAB — COMPLEXED PSA SERPL-MCNC: 0.35 NG/ML (ref 0–4)

## 2022-04-26 PROCEDURE — 3077F SYST BP >= 140 MM HG: CPT | Mod: CPTII,S$GLB,, | Performed by: NURSE PRACTITIONER

## 2022-04-26 PROCEDURE — 1126F PR PAIN SEVERITY QUANTIFIED, NO PAIN PRESENT: ICD-10-PCS | Mod: CPTII,S$GLB,, | Performed by: NURSE PRACTITIONER

## 2022-04-26 PROCEDURE — 1101F PR PT FALLS ASSESS DOC 0-1 FALLS W/OUT INJ PAST YR: ICD-10-PCS | Mod: CPTII,S$GLB,, | Performed by: NURSE PRACTITIONER

## 2022-04-26 PROCEDURE — 99999 PR PBB SHADOW E&M-EST. PATIENT-LVL III: CPT | Mod: PBBFAC,,, | Performed by: NURSE PRACTITIONER

## 2022-04-26 PROCEDURE — 99214 OFFICE O/P EST MOD 30 MIN: CPT | Mod: S$GLB,,, | Performed by: NURSE PRACTITIONER

## 2022-04-26 PROCEDURE — 1160F PR REVIEW ALL MEDS BY PRESCRIBER/CLIN PHARMACIST DOCUMENTED: ICD-10-PCS | Mod: CPTII,S$GLB,, | Performed by: NURSE PRACTITIONER

## 2022-04-26 PROCEDURE — 51798 POCT BLADDER SCAN: ICD-10-PCS | Mod: S$GLB,,, | Performed by: NURSE PRACTITIONER

## 2022-04-26 PROCEDURE — 3079F PR MOST RECENT DIASTOLIC BLOOD PRESSURE 80-89 MM HG: ICD-10-PCS | Mod: CPTII,S$GLB,, | Performed by: NURSE PRACTITIONER

## 2022-04-26 PROCEDURE — 3079F DIAST BP 80-89 MM HG: CPT | Mod: CPTII,S$GLB,, | Performed by: NURSE PRACTITIONER

## 2022-04-26 PROCEDURE — 3288F FALL RISK ASSESSMENT DOCD: CPT | Mod: CPTII,S$GLB,, | Performed by: NURSE PRACTITIONER

## 2022-04-26 PROCEDURE — 4010F ACE/ARB THERAPY RXD/TAKEN: CPT | Mod: CPTII,S$GLB,, | Performed by: NURSE PRACTITIONER

## 2022-04-26 PROCEDURE — 99214 PR OFFICE/OUTPT VISIT, EST, LEVL IV, 30-39 MIN: ICD-10-PCS | Mod: S$GLB,,, | Performed by: NURSE PRACTITIONER

## 2022-04-26 PROCEDURE — 84153 ASSAY OF PSA TOTAL: CPT

## 2022-04-26 PROCEDURE — 1101F PT FALLS ASSESS-DOCD LE1/YR: CPT | Mod: CPTII,S$GLB,, | Performed by: NURSE PRACTITIONER

## 2022-04-26 PROCEDURE — 51798 US URINE CAPACITY MEASURE: CPT | Mod: S$GLB,,, | Performed by: NURSE PRACTITIONER

## 2022-04-26 PROCEDURE — 1159F MED LIST DOCD IN RCRD: CPT | Mod: CPTII,S$GLB,, | Performed by: NURSE PRACTITIONER

## 2022-04-26 PROCEDURE — 99999 PR PBB SHADOW E&M-EST. PATIENT-LVL III: ICD-10-PCS | Mod: PBBFAC,,, | Performed by: NURSE PRACTITIONER

## 2022-04-26 PROCEDURE — 1160F RVW MEDS BY RX/DR IN RCRD: CPT | Mod: CPTII,S$GLB,, | Performed by: NURSE PRACTITIONER

## 2022-04-26 PROCEDURE — 3008F PR BODY MASS INDEX (BMI) DOCUMENTED: ICD-10-PCS | Mod: CPTII,S$GLB,, | Performed by: NURSE PRACTITIONER

## 2022-04-26 PROCEDURE — 3288F PR FALLS RISK ASSESSMENT DOCUMENTED: ICD-10-PCS | Mod: CPTII,S$GLB,, | Performed by: NURSE PRACTITIONER

## 2022-04-26 PROCEDURE — 3077F PR MOST RECENT SYSTOLIC BLOOD PRESSURE >= 140 MM HG: ICD-10-PCS | Mod: CPTII,S$GLB,, | Performed by: NURSE PRACTITIONER

## 2022-04-26 PROCEDURE — 4010F PR ACE/ARB THEARPY RXD/TAKEN: ICD-10-PCS | Mod: CPTII,S$GLB,, | Performed by: NURSE PRACTITIONER

## 2022-04-26 PROCEDURE — 3008F BODY MASS INDEX DOCD: CPT | Mod: CPTII,S$GLB,, | Performed by: NURSE PRACTITIONER

## 2022-04-26 PROCEDURE — 1126F AMNT PAIN NOTED NONE PRSNT: CPT | Mod: CPTII,S$GLB,, | Performed by: NURSE PRACTITIONER

## 2022-04-26 PROCEDURE — 1159F PR MEDICATION LIST DOCUMENTED IN MEDICAL RECORD: ICD-10-PCS | Mod: CPTII,S$GLB,, | Performed by: NURSE PRACTITIONER

## 2022-04-26 PROCEDURE — 36415 COLL VENOUS BLD VENIPUNCTURE: CPT

## 2022-04-26 NOTE — PROGRESS NOTES
CHIEF COMPLAINT:  Urinary frequency     HISTORY OF PRESENTING ILLINESS:  Clement Sweet is a 67 y.o. male with a history of T2DM, HTN, ED who presents with frequency for many years. [facility] urology patient, last seen by Dr. Ferguson 8/16/21. Was seeing Dr. Santos in 2019 for BPH and was prescribed Flomax and Finasteride.   However, he stopped them almost immediately after starting them.  He has + frequency, + nocturia x 4-5.  Dr. Ferguson started him back on flomax - pt is unsure if the Flomax improved LUTS but would like to continue taking it. He takes the flomax sporadically - instructed to follow directions.  However, he still c/o frequency and nocturia.  Prescribed ditropan for frequency in he past, but he didn't notice a benefit and stop taking.  He thinks his frequency is due to his diabetes. Does report drinking 1 cup coffee in the morning with 2 teaspoons sugar.    REVIEW OF SYSTEMS:  Review of Systems   Constitutional: Negative for chills and fever.   HENT: Negative for congestion and sore throat.    Respiratory: Negative for cough and shortness of breath.    Cardiovascular: Negative for chest pain and palpitations.   Genitourinary: Positive for frequency. Negative for dysuria, flank pain, hematuria and urgency.        Nocturia 4-5x   Neurological: Negative for dizziness and headaches.     PATIENT HISTORY:    Past Medical History:   Diagnosis Date    DM (diabetes mellitus)     ED (erectile dysfunction)     HTN (hypertension)     Metformin adverse reaction 7/30/2021    Type 2 diabetes mellitus        History reviewed. No pertinent surgical history.    Family History   Problem Relation Age of Onset    Hypertension Mother     Diabetes Mother     Cancer Father     Diabetes Brother     Cirrhosis Neg Hx        Social History     Socioeconomic History    Marital status: Single   Tobacco Use    Smoking status: Former Smoker    Smokeless tobacco: Never Used   Substance and Sexual Activity    Alcohol use:  Not Currently    Drug use: Never    Sexual activity: Yes       Allergies:  Enalapril    Medications:    Current Outpatient Medications:     atorvastatin (LIPITOR) 20 MG tablet, Take 1 tablet (20 mg total) by mouth once daily., Disp: 90 tablet, Rfl: 3    fluticasone propionate (FLONASE) 50 mcg/actuation nasal spray, , Disp: , Rfl:     losartan (COZAAR) 25 MG tablet, Take 1 tablet (25 mg total) by mouth once daily., Disp: 90 tablet, Rfl: 3    metFORMIN (GLUCOPHAGE-XR) 500 MG ER 24hr tablet, Take 1 tablet (500 mg total) by mouth daily with breakfast., Disp: 90 tablet, Rfl: 3    tadalafiL (CIALIS) 20 MG Tab, Take 1 tablet (20 mg total) by mouth once daily. Take 1 hour before intercourse, Disp: 10 tablet, Rfl: 11    tamsulosin (FLOMAX) 0.4 mg Cap, Take 1 capsule (0.4 mg total) by mouth once daily., Disp: 90 capsule, Rfl: 3    timoloL 0.25 % ophthalmic solution, Place 2 drops into both eyes 2 (two) times daily., Disp: , Rfl:     topiramate (TOPAMAX) 50 MG tablet, Take 3 tablets (150 mg total) by mouth once daily., Disp: 90 tablet, Rfl: 11    varicella-zoster gE-AS01B, PF, (SHINGRIX, PF,) 50 mcg/0.5 mL injection, Inject into the muscle., Disp: 1 each, Rfl: 0    PHYSICAL EXAMINATION:  Physical Exam  Constitutional:       Appearance: Normal appearance.   HENT:      Head: Normocephalic.      Right Ear: External ear normal.      Left Ear: External ear normal.   Pulmonary:      Effort: Pulmonary effort is normal. No respiratory distress.   Musculoskeletal:         General: Normal range of motion.   Skin:     General: Skin is warm and dry.   Neurological:      General: No focal deficit present.      Mental Status: He is alert and oriented to person, place, and time.   Psychiatric:         Mood and Affect: Mood normal.         Behavior: Behavior normal.       LABS:    In office UA today was not collected - emptied bladder in waiting room BR  PVR 23ml       Lab Results   Component Value Date    PSADIAG 0.37 04/07/2021              IMPRESSION:    Encounter Diagnoses   Name Primary?    BPH with obstruction/lower urinary tract symptoms Yes    Type 2 diabetes mellitus with microalbuminuria, without long-term current use of insulin          Assessment:       1. BPH with obstruction/lower urinary tract symptoms    2. Type 2 diabetes mellitus with microalbuminuria, without long-term current use of insulin        Plan:   1. BPH  - lifestyle modifications reviewed with patient. LUTS more bothersome at night, instructed to restrict fluid intake 2-3 hours before bedtime and to elevate legs 1-2 hours prior to bedtime. Pt would like to continue Flomax although he in unsure if it is beneficial.   - Pelvic floor PT recommended for patient due to his medication aversion - declined PT at this time. Will discuss again in August.  - PSA today  - follow up in August with [facility]     2. Type 2 DM   - reviewed most recent Hemoglobin A1C, counseled on diet and exercise for tighter A1C control.     I spent 30 minutes with the patient of which more than half was spent in direct consultation with the patient in regards to our treatment and plan.  We addressed the office findings and recent labs.   Education and recommendations of today's plan of care including home remedies and needed follow up with PCP.

## 2022-06-01 ENCOUNTER — PATIENT MESSAGE (OUTPATIENT)
Dept: INTERNAL MEDICINE | Facility: CLINIC | Age: 67
End: 2022-06-01
Payer: MEDICARE

## 2022-06-22 DIAGNOSIS — E11.9 TYPE 2 DIABETES MELLITUS WITHOUT COMPLICATION: ICD-10-CM

## 2022-07-06 DIAGNOSIS — E11.9 TYPE 2 DIABETES MELLITUS WITHOUT COMPLICATION: ICD-10-CM

## 2022-07-14 ENCOUNTER — PATIENT OUTREACH (OUTPATIENT)
Dept: ADMINISTRATIVE | Facility: HOSPITAL | Age: 67
End: 2022-07-14
Payer: MEDICARE

## 2022-08-04 ENCOUNTER — TELEPHONE (OUTPATIENT)
Dept: NEUROLOGY | Facility: CLINIC | Age: 67
End: 2022-08-04
Payer: MEDICARE

## 2022-09-02 ENCOUNTER — PATIENT OUTREACH (OUTPATIENT)
Dept: ADMINISTRATIVE | Facility: HOSPITAL | Age: 67
End: 2022-09-02
Payer: MEDICARE

## 2022-09-08 ENCOUNTER — TELEPHONE (OUTPATIENT)
Dept: INTERNAL MEDICINE | Facility: CLINIC | Age: 67
End: 2022-09-08
Payer: MEDICARE

## 2022-09-08 NOTE — TELEPHONE ENCOUNTER
Pt has been taking bp meds everyday, his bp has been reading 150's over 80's last couple of days with 59 pulse rate. Little concern on if the bp med is working..

## 2022-09-08 NOTE — TELEPHONE ENCOUNTER
----- Message from Cee Davis sent at 9/8/2022  2:33 PM CDT -----  Contact: Clement   Clement would like a call back about blood pressure issues. This morning @ 9:00 it was 158/88 & Heart Rate 59 .He is taking Losartan 25 mg

## 2022-09-12 ENCOUNTER — CLINICAL SUPPORT (OUTPATIENT)
Dept: INTERNAL MEDICINE | Facility: CLINIC | Age: 67
End: 2022-09-12
Payer: MEDICARE

## 2022-09-12 ENCOUNTER — TELEPHONE (OUTPATIENT)
Dept: INTERNAL MEDICINE | Facility: CLINIC | Age: 67
End: 2022-09-12

## 2022-09-12 VITALS
HEIGHT: 68 IN | SYSTOLIC BLOOD PRESSURE: 130 MMHG | BODY MASS INDEX: 30.74 KG/M2 | OXYGEN SATURATION: 98 % | HEART RATE: 58 BPM | DIASTOLIC BLOOD PRESSURE: 72 MMHG | WEIGHT: 202.81 LBS

## 2022-09-12 PROCEDURE — 99999 PR PBB SHADOW E&M-EST. PATIENT-LVL III: CPT | Mod: PBBFAC,,,

## 2022-09-12 PROCEDURE — 99999 PR PBB SHADOW E&M-EST. PATIENT-LVL III: ICD-10-PCS | Mod: PBBFAC,,,

## 2022-09-12 NOTE — PROGRESS NOTES
Patient here for BP check, /72 and 132/70 recheck manually.  BP taken on home machine is 140/78, Patient denies headache, dizziness, sob and chest pain. Five home BP readings from home machine are 158/86, 160/92, 140/80, 139/82, 146/80. Medications and pharmacy reviewed. Patient reports taking all medication as prescribed. Information relayed to PCP for review.      Patient reports that he will take his BP at home with a different BP cuff prior to changing medication, patient reports he will send in readings to the office with a different cuff

## 2022-09-20 ENCOUNTER — HOSPITAL ENCOUNTER (OUTPATIENT)
Dept: RADIOLOGY | Facility: HOSPITAL | Age: 67
Discharge: HOME | End: 2022-09-20
Attending: NURSE PRACTITIONER
Payer: MEDICARE

## 2022-09-20 DIAGNOSIS — K76.0 NAFLD (NONALCOHOLIC FATTY LIVER DISEASE): ICD-10-CM

## 2022-09-20 DIAGNOSIS — R74.01 ELEVATED TRANSAMINASE LEVEL: ICD-10-CM

## 2022-09-20 PROCEDURE — 76700 US EXAM ABDOM COMPLETE: CPT | Mod: TC

## 2022-09-20 PROCEDURE — 76700 US ABDOMEN COMPLETE: ICD-10-PCS | Mod: 26,,, | Performed by: RADIOLOGY

## 2022-09-20 PROCEDURE — 76700 US EXAM ABDOM COMPLETE: CPT | Mod: 26,,, | Performed by: RADIOLOGY

## 2022-09-27 ENCOUNTER — OFFICE VISIT (OUTPATIENT)
Dept: HEPATOLOGY | Facility: CLINIC | Age: 67
End: 2022-09-27
Payer: MEDICARE

## 2022-09-27 VITALS
RESPIRATION RATE: 18 BRPM | BODY MASS INDEX: 30.84 KG/M2 | DIASTOLIC BLOOD PRESSURE: 72 MMHG | SYSTOLIC BLOOD PRESSURE: 154 MMHG | OXYGEN SATURATION: 97 % | HEIGHT: 68 IN | TEMPERATURE: 98 F | HEART RATE: 65 BPM | WEIGHT: 203.5 LBS

## 2022-09-27 DIAGNOSIS — K74.00 HEPATIC FIBROSIS: ICD-10-CM

## 2022-09-27 DIAGNOSIS — E66.09 CLASS 1 OBESITY DUE TO EXCESS CALORIES WITH SERIOUS COMORBIDITY AND BODY MASS INDEX (BMI) OF 30.0 TO 30.9 IN ADULT: ICD-10-CM

## 2022-09-27 DIAGNOSIS — E11.29 TYPE 2 DIABETES MELLITUS WITH MICROALBUMINURIA, WITHOUT LONG-TERM CURRENT USE OF INSULIN: ICD-10-CM

## 2022-09-27 DIAGNOSIS — R74.01 ELEVATED TRANSAMINASE LEVEL: ICD-10-CM

## 2022-09-27 DIAGNOSIS — R80.9 TYPE 2 DIABETES MELLITUS WITH MICROALBUMINURIA, WITHOUT LONG-TERM CURRENT USE OF INSULIN: ICD-10-CM

## 2022-09-27 DIAGNOSIS — Z23 NEED FOR HEPATITIS A AND B VACCINATION: ICD-10-CM

## 2022-09-27 DIAGNOSIS — K76.0 NAFLD (NONALCOHOLIC FATTY LIVER DISEASE): Primary | ICD-10-CM

## 2022-09-27 PROCEDURE — 99499 UNLISTED E&M SERVICE: CPT | Mod: S$GLB,,, | Performed by: NURSE PRACTITIONER

## 2022-09-27 PROCEDURE — 3051F HG A1C>EQUAL 7.0%<8.0%: CPT | Mod: CPTII,S$GLB,, | Performed by: NURSE PRACTITIONER

## 2022-09-27 PROCEDURE — 99214 PR OFFICE/OUTPT VISIT, EST, LEVL IV, 30-39 MIN: ICD-10-PCS | Mod: S$GLB,,, | Performed by: NURSE PRACTITIONER

## 2022-09-27 PROCEDURE — 3078F DIAST BP <80 MM HG: CPT | Mod: CPTII,S$GLB,, | Performed by: NURSE PRACTITIONER

## 2022-09-27 PROCEDURE — 1159F PR MEDICATION LIST DOCUMENTED IN MEDICAL RECORD: ICD-10-PCS | Mod: CPTII,S$GLB,, | Performed by: NURSE PRACTITIONER

## 2022-09-27 PROCEDURE — 3008F PR BODY MASS INDEX (BMI) DOCUMENTED: ICD-10-PCS | Mod: CPTII,S$GLB,, | Performed by: NURSE PRACTITIONER

## 2022-09-27 PROCEDURE — 1101F PR PT FALLS ASSESS DOC 0-1 FALLS W/OUT INJ PAST YR: ICD-10-PCS | Mod: CPTII,S$GLB,, | Performed by: NURSE PRACTITIONER

## 2022-09-27 PROCEDURE — 99214 OFFICE O/P EST MOD 30 MIN: CPT | Mod: S$GLB,,, | Performed by: NURSE PRACTITIONER

## 2022-09-27 PROCEDURE — 3008F BODY MASS INDEX DOCD: CPT | Mod: CPTII,S$GLB,, | Performed by: NURSE PRACTITIONER

## 2022-09-27 PROCEDURE — 4010F PR ACE/ARB THEARPY RXD/TAKEN: ICD-10-PCS | Mod: CPTII,S$GLB,, | Performed by: NURSE PRACTITIONER

## 2022-09-27 PROCEDURE — 1101F PT FALLS ASSESS-DOCD LE1/YR: CPT | Mod: CPTII,S$GLB,, | Performed by: NURSE PRACTITIONER

## 2022-09-27 PROCEDURE — 3077F SYST BP >= 140 MM HG: CPT | Mod: CPTII,S$GLB,, | Performed by: NURSE PRACTITIONER

## 2022-09-27 PROCEDURE — 4010F ACE/ARB THERAPY RXD/TAKEN: CPT | Mod: CPTII,S$GLB,, | Performed by: NURSE PRACTITIONER

## 2022-09-27 PROCEDURE — 1126F AMNT PAIN NOTED NONE PRSNT: CPT | Mod: CPTII,S$GLB,, | Performed by: NURSE PRACTITIONER

## 2022-09-27 PROCEDURE — 3288F FALL RISK ASSESSMENT DOCD: CPT | Mod: CPTII,S$GLB,, | Performed by: NURSE PRACTITIONER

## 2022-09-27 PROCEDURE — 1126F PR PAIN SEVERITY QUANTIFIED, NO PAIN PRESENT: ICD-10-PCS | Mod: CPTII,S$GLB,, | Performed by: NURSE PRACTITIONER

## 2022-09-27 PROCEDURE — 3078F PR MOST RECENT DIASTOLIC BLOOD PRESSURE < 80 MM HG: ICD-10-PCS | Mod: CPTII,S$GLB,, | Performed by: NURSE PRACTITIONER

## 2022-09-27 PROCEDURE — 99999 PR PBB SHADOW E&M-EST. PATIENT-LVL V: CPT | Mod: PBBFAC,,, | Performed by: NURSE PRACTITIONER

## 2022-09-27 PROCEDURE — 99499 RISK ADDL DX/OHS AUDIT: ICD-10-PCS | Mod: S$GLB,,, | Performed by: NURSE PRACTITIONER

## 2022-09-27 PROCEDURE — 99999 PR PBB SHADOW E&M-EST. PATIENT-LVL V: ICD-10-PCS | Mod: PBBFAC,,, | Performed by: NURSE PRACTITIONER

## 2022-09-27 PROCEDURE — 3051F PR MOST RECENT HEMOGLOBIN A1C LEVEL 7.0 - < 8.0%: ICD-10-PCS | Mod: CPTII,S$GLB,, | Performed by: NURSE PRACTITIONER

## 2022-09-27 PROCEDURE — 3077F PR MOST RECENT SYSTOLIC BLOOD PRESSURE >= 140 MM HG: ICD-10-PCS | Mod: CPTII,S$GLB,, | Performed by: NURSE PRACTITIONER

## 2022-09-27 PROCEDURE — 3288F PR FALLS RISK ASSESSMENT DOCUMENTED: ICD-10-PCS | Mod: CPTII,S$GLB,, | Performed by: NURSE PRACTITIONER

## 2022-09-27 PROCEDURE — 1159F MED LIST DOCD IN RCRD: CPT | Mod: CPTII,S$GLB,, | Performed by: NURSE PRACTITIONER

## 2022-09-27 RX ORDER — AZELASTINE 1 MG/ML
SPRAY, METERED NASAL
COMMUNITY
Start: 2022-06-06 | End: 2023-04-26

## 2022-09-27 RX ORDER — LATANOPROST 50 UG/ML
SOLUTION/ DROPS OPHTHALMIC
COMMUNITY
Start: 2022-09-03 | End: 2023-04-26

## 2022-09-27 RX ORDER — DORZOLAMIDE HYDROCHLORIDE AND TIMOLOL MALEATE 20; 5 MG/ML; MG/ML
1 SOLUTION/ DROPS OPHTHALMIC
COMMUNITY
Start: 2022-09-19 | End: 2023-05-03

## 2022-09-27 NOTE — PROGRESS NOTES
[facility] - Established Patient    Last Clinic Visit: 9/27/21    Chief Complaint: Follow-up for fatty liver       HISTORY     This is a 67 y.o. male with PMH noted below, here for follow-up of fatty liver.    First told that he had fatty liver many years ago.    Fatty liver first noted on US in our system 8/19/21.  Imaging without findings to suggest advanced liver disease.  Region of focal fatty sparing also noted on US; ~3 cm.   His MRI 9/2021 showed 2 non-specific liver lesions, too small to characterize.     His liver enzymes have been elevated for multiple years, ALT>AST, 40s-90.    Serologic workup has been negative for Rickie's, alpha-1 antitrypsin deficiency, hemochromatosis, autoimmune etiology, and viral hepatitis. Ferritin elevated, >500 though iron sat WNL.    Fibrosis staging:   Fibroscan 8/2021 = F3, S3  MRI elastography 9/2021 = F1-2, moderate-severe steatosis    Health Maintenance:  - Most recent imaging: abd US 9/20/22 with no new liver lesions (stable area of focal fatty sparing noted)  - Hepatitis A & B vaccination: needs vaccines    Interval history:  Feels well today, no concerns.     Liver enzymes improving: AST 34, ALT 63    Updates on risk factors for fatty liver:  Weight -- Body mass index is 30.94 kg/m².          Weight relatively unchanged                Dyslipidemia -- well controlled, on statin                                 Insulin resistance / diabetes -- HgbA1c 7.0         Hypertension -- well controlled  Alcohol use -- none          Past medical history, surgical history, problem list, family history, social history, allergies: Reviewed and updated in the appropriate section of the electronic medical record.      Current Outpatient Medications   Medication Sig Dispense Refill    azelastine (ASTELIN) 137 mcg (0.1 %) nasal spray USE 1 TO 2 SPRAY(S) IN EACH NOSTRIL TWICE DAILY      dorzolamide-timolol 2-0.5% (COSOPT) 22.3-6.8 mg/mL ophthalmic solution Apply 1 drop to  eye.      fluticasone propionate (FLONASE) 50 mcg/actuation nasal spray       latanoprost 0.005 % ophthalmic solution Place into both eyes.      metFORMIN (GLUCOPHAGE-XR) 500 MG ER 24hr tablet Take 1 tablet (500 mg total) by mouth daily with breakfast. 90 tablet 3    atorvastatin (LIPITOR) 20 MG tablet Take 1 tablet (20 mg total) by mouth once daily. 90 tablet 3    hepatitis A and B vaccine, PF, (TWINRIX) 720 ENIO unit- 20 mcg/mL Syrg suspension Inject 1 mL (720 Units total) into the muscle once. Second dose 1 month from initial dose. Third dose 6 months from initial dose. for 1 dose 1 mL 2    losartan (COZAAR) 50 MG tablet Take 1 tablet (50 mg total) by mouth once daily. (Patient not taking: Reported on 9/27/2022) 90 tablet 3    tadalafiL (CIALIS) 20 MG Tab Take 1 tablet (20 mg total) by mouth once daily. Take 1 hour before intercourse (Patient not taking: Reported on 9/27/2022) 10 tablet 11    tamsulosin (FLOMAX) 0.4 mg Cap Take 1 capsule (0.4 mg total) by mouth once daily. (Patient not taking: Reported on 9/27/2022) 90 capsule 3    timoloL 0.25 % ophthalmic solution Place 2 drops into both eyes 2 (two) times daily.      topiramate (TOPAMAX) 50 MG tablet Take 3 tablets (150 mg total) by mouth once daily. (Patient not taking: Reported on 9/27/2022) 90 tablet 11     No current facility-administered medications for this visit.     Medication list reviewed and updated.      Review of Systems   As per HPI      Physical Exam   Constitutional: Well-nourished. No distress. Alert and oriented.  Eyes: No scleral icterus.   Pulmonary/Chest: Respiratory effort normal. No respiratory distress.   Abdominal: No distension, no ascites appreciated.   Extremities: No edema.   Neurological: No tremor or asterixis. Gait normal.  Skin: No jaundice. No spider telangiectasias or palmar erythema.  Psychiatric: Normal mood and affect. Speech, behavior, and thought content normal. No depression or anxiety noted.       Vitals reviewed.  BP  "(!) 154/72 (BP Location: Right arm, Patient Position: Sitting, BP Method: Medium (Automatic))   Pulse 65   Temp 97.9 °F (36.6 °C) (Oral)   Resp 18   Ht 5' 8" (1.727 m)   Wt 92.3 kg (203 lb 7.8 oz)   SpO2 97%   BMI 30.94 kg/m²       LABS & DIAGNOSTIC STUDIES     I have personally reviewed pertinent laboratory findings:    Lab Results   Component Value Date    ALT 63 (H) 09/20/2022    AST 34 09/20/2022    ALKPHOS 57 09/20/2022    BILITOT 0.5 09/20/2022    ALBUMIN 3.8 09/20/2022    INR 1.0 09/20/2021       Lab Results   Component Value Date    WBC 5.93 07/30/2021    HGB 15.4 07/30/2021    HCT 48.4 07/30/2021    MCV 95 07/30/2021     07/30/2021       Lab Results   Component Value Date     09/20/2021    K 4.6 09/20/2021    BUN 12 09/20/2021    CREATININE 1.2 09/20/2021    ESTGFRAFRICA >60.0 09/20/2021    EGFRNONAA >60.0 09/20/2021       Lab Results   Component Value Date    SMOOTHMUSCAB Negative 1:40 09/20/2021    AMAIFA Negative 1:40 09/20/2021    IGGSERUM 1530 09/20/2021    ANASCREEN Positive (A) 09/20/2021    FERRITIN 595 (H) 07/30/2021    FESATURATED 28 07/30/2021    CERULOPLSM 25.0 09/20/2021    HEPBSAG Negative 07/30/2021    HEPBIGM Negative 07/30/2021    HEPBCAB Negative 09/20/2021    HEPCAB Negative 07/30/2021    HEPAIGM Negative 07/30/2021       Lab Results   Component Value Date    AFP 2.9 09/20/2021       I have personally reviewed the following result reports:  Abdominal US - 9/20/22        ASSESSMENT & PLAN     67 y.o. male with:    1. NAFLD with hepatic fibrosis  -- Labs, imaging, and fibrosis staging reviewed. MRI elastography last year suggested less fibrosis, F1-2. Will repeat next year to reassess.  -- Liver enzymes remains elevated but are improving. Anticipate further decreased with weight loss and lower blood sugar.  -- Lab monitoring 6-12 months  -- Not interested in [facility]TE clinical trials at this time   -- Hep A/B vaccines, ordered    2. Body mass index is 30.94 kg/m²., HTN, HLD, " DM  -- We reviewed his risk factors for fatty liver  -- Reviewed weight loss strategies including dietary changes and physical activity. Discussed low carbohydrate, low sugar diet. Recommend weight loss goal of 10% (about 20 lb).   -- We discussed additional weight loss resources including dietician consult, [facility] program, and bariatric medicine consult. He is interested in dietician consult, referral placed.       Orders Placed This Encounter   Procedures    MR Elastography    Hepatitis A / Hepatitis B Combined Vaccine (IM)    Hepatic Function Panel    Ambulatory Referral/Consult to Nutrition Services - [facility]    Ambulatory referral/consult to Infectious Disease Injection Dept       *See AVS for patient education and instructions.      Return to clinic in 1 year with labs + MRI elasto prior.      Thank you for allowing me to participate in the care of Clement Guadarrama, ROSALVA-[facility]  Hepatology        Duration of encounter: 30 min  This includes face to face time and non-face to face time preparing to see the patient (eg, review of tests), obtaining and/or reviewing separately obtained history, documenting clinical information in the electronic or other health record, independently interpreting results and communicating results to the patient/family/caregiver, or care coordination.

## 2022-09-27 NOTE — PATIENT INSTRUCTIONS
Referral to dietican at [facility]    If interested in seeing a dietician to create a weight loss plan, contact the dietician team at [facility]: [email or web address].  You can also call to schedule a consult with a dietician: [phone]  *Virtual visits are available with one of our dieticians.     2.   Follow-up in 1 year with MRI prior    3.   Recommend vaccines for hepatitis A and B (Twinrix)  If you would like to first determine the cost of the vaccines, you can call the [facility] office at [phone] or [phone].        Hepatitis A/B vaccines:   Your immunity markers show that you do NOT have immunity or protection against Hepatitis A or B.  I recommend that you receive the combination Hepatitis A and B vaccine called TwinRix.     Hepatitis A can be transmitted through food and water and can cause significant liver injury. There recently was an outbreak of hepatitis A in [place].  Hepatitis B is transmitted through blood or bodily fluids and there are typically no symptoms. This can become a chronic or longstanding infection and many people have it for life.  The vaccines will protect your liver from these viruses that can cause significant harm to the liver.    The vaccine series includes 3 vaccines: one now, one at 4 weeks, and one 6 months after the 1st dose.  I sent the vaccine orders to the [facility]. You can call to see if the vaccine is covered by your insurance and arrange to receive the vaccines there: [phone].      If the vaccines are not covered in the pharmacy, I have also placed an order to get the vaccines in the Infectious Disease department at [facility]. You can call [phone] to schedule your vaccine appointment in the ID injection department here.              Fatty liver    There is no FDA approved therapy for non-alcoholic fatty liver disease (NAFLD); therefore, lifestyle changes are important:  1. Weight  loss goal of 20 lbs  2. Low carb/sugar, high protein diet.   3. Exercise, 5 days per week, 30 minutes per day, as tolerated  4. Recommend good control of cholesterol, blood pressure, blood sugar levels (as these are risk factors for fatty liver). Cholesterol lowering medications including statins are typically safe and beneficial (even if liver enzymes are elevated).    5. Limit alcohol consumption, no more than 2 serving(s) of alcohol in any day (1 serving is 5 ounces of wine, 12 ounces of beer, or 1.5 ounces of liquor). Do not recommend daily alcohol use.      In some people, fatty liver can progress to steatohepatitis (inflamatory fatty liver) and possibly to cirrhosis, putting one at increased risk for liver cancer and liver failure. Lifestyle changes can help to decrease this risk.     Ask about our fatty liver/MONTE clinical trials if you have fibrosis / scar tissue related to fatty liver.        Additional Resources:    Websites with information about fatty liver and inflammation related to fatty liver (MONTE): www.[email or web address]Fusion Sheep and [email or web address].Fusion Sheep   [facility]: Non-Alcoholic Fatty Liver Disease (NAFLD)    Facebook support group with tips and recipes:   Non-Alcoholic Fatty Liver Disease (NAFLD) Diet & Nutrition Support     Can download MyFitness Pal or Lose It davon to add up your carbohydrates throughout the day.   Tip -- Avoid beverages with calories or carbohydrates.   Try [email or web address] Sheep for recipes.    If interested in seeing a dietician to create a weight loss plan, contact the dietician team at [facility]: [email or web address].  You can also call to schedule a consult with a dietician: [phone]  *Virtual visits are available with one of our dieticians.     If you have diabetes or high blood pressure, you can meet with a Health  through [facility]. Let us know if you are interested in a referral.     Let us know if you are interested in a referral to  [facility].

## 2022-10-10 ENCOUNTER — TELEPHONE (OUTPATIENT)
Dept: NEUROLOGY | Facility: CLINIC | Age: 67
End: 2022-10-10
Payer: MEDICARE

## 2022-10-13 ENCOUNTER — OFFICE VISIT (OUTPATIENT)
Dept: NEUROLOGY | Facility: CLINIC | Age: 67
End: 2022-10-13
Payer: MEDICARE

## 2022-10-13 VITALS
WEIGHT: 203 LBS | DIASTOLIC BLOOD PRESSURE: 79 MMHG | HEIGHT: 68 IN | HEART RATE: 49 BPM | SYSTOLIC BLOOD PRESSURE: 149 MMHG | BODY MASS INDEX: 30.77 KG/M2

## 2022-10-13 DIAGNOSIS — G43.009 MIGRAINE WITHOUT AURA AND WITHOUT STATUS MIGRAINOSUS, NOT INTRACTABLE: Primary | ICD-10-CM

## 2022-10-13 PROCEDURE — 4010F ACE/ARB THERAPY RXD/TAKEN: CPT | Mod: CPTII,S$GLB,,

## 2022-10-13 PROCEDURE — 99999 PR PBB SHADOW E&M-EST. PATIENT-LVL IV: CPT | Mod: PBBFAC,,,

## 2022-10-13 PROCEDURE — 1125F PR PAIN SEVERITY QUANTIFIED, PAIN PRESENT: ICD-10-PCS | Mod: CPTII,S$GLB,,

## 2022-10-13 PROCEDURE — 99499 RISK ADDL DX/OHS AUDIT: ICD-10-PCS | Mod: S$GLB,,,

## 2022-10-13 PROCEDURE — 99999 PR PBB SHADOW E&M-EST. PATIENT-LVL IV: ICD-10-PCS | Mod: PBBFAC,,,

## 2022-10-13 PROCEDURE — 1159F MED LIST DOCD IN RCRD: CPT | Mod: CPTII,S$GLB,,

## 2022-10-13 PROCEDURE — 1159F PR MEDICATION LIST DOCUMENTED IN MEDICAL RECORD: ICD-10-PCS | Mod: CPTII,S$GLB,,

## 2022-10-13 PROCEDURE — 3051F PR MOST RECENT HEMOGLOBIN A1C LEVEL 7.0 - < 8.0%: ICD-10-PCS | Mod: CPTII,S$GLB,,

## 2022-10-13 PROCEDURE — 4010F PR ACE/ARB THEARPY RXD/TAKEN: ICD-10-PCS | Mod: CPTII,S$GLB,,

## 2022-10-13 PROCEDURE — 3077F PR MOST RECENT SYSTOLIC BLOOD PRESSURE >= 140 MM HG: ICD-10-PCS | Mod: CPTII,S$GLB,,

## 2022-10-13 PROCEDURE — 1101F PT FALLS ASSESS-DOCD LE1/YR: CPT | Mod: CPTII,S$GLB,,

## 2022-10-13 PROCEDURE — 3078F PR MOST RECENT DIASTOLIC BLOOD PRESSURE < 80 MM HG: ICD-10-PCS | Mod: CPTII,S$GLB,,

## 2022-10-13 PROCEDURE — 3051F HG A1C>EQUAL 7.0%<8.0%: CPT | Mod: CPTII,S$GLB,,

## 2022-10-13 PROCEDURE — 3078F DIAST BP <80 MM HG: CPT | Mod: CPTII,S$GLB,,

## 2022-10-13 PROCEDURE — 3288F PR FALLS RISK ASSESSMENT DOCUMENTED: ICD-10-PCS | Mod: CPTII,S$GLB,,

## 2022-10-13 PROCEDURE — 3288F FALL RISK ASSESSMENT DOCD: CPT | Mod: CPTII,S$GLB,,

## 2022-10-13 PROCEDURE — 99214 OFFICE O/P EST MOD 30 MIN: CPT | Mod: S$GLB,,,

## 2022-10-13 PROCEDURE — 1160F PR REVIEW ALL MEDS BY PRESCRIBER/CLIN PHARMACIST DOCUMENTED: ICD-10-PCS | Mod: CPTII,S$GLB,,

## 2022-10-13 PROCEDURE — 1160F RVW MEDS BY RX/DR IN RCRD: CPT | Mod: CPTII,S$GLB,,

## 2022-10-13 PROCEDURE — 99214 PR OFFICE/OUTPT VISIT, EST, LEVL IV, 30-39 MIN: ICD-10-PCS | Mod: S$GLB,,,

## 2022-10-13 PROCEDURE — 1101F PR PT FALLS ASSESS DOC 0-1 FALLS W/OUT INJ PAST YR: ICD-10-PCS | Mod: CPTII,S$GLB,,

## 2022-10-13 PROCEDURE — 1125F AMNT PAIN NOTED PAIN PRSNT: CPT | Mod: CPTII,S$GLB,,

## 2022-10-13 PROCEDURE — 99499 UNLISTED E&M SERVICE: CPT | Mod: S$GLB,,,

## 2022-10-13 PROCEDURE — 3077F SYST BP >= 140 MM HG: CPT | Mod: CPTII,S$GLB,,

## 2022-10-13 NOTE — PATIENT INSTRUCTIONS
Natural approaches to increasing brain energy and serotonin:    SAMe 200 mg a day    Vitamin B2 400 mg daily    Vitamin B12 1000 mcg daily    Getting early morning light to increase natural serotonin, melatonin. Could also consider light therapy box, 10,000 LUX.     Headache prevention and acute treatment over-the counter supplements    Boswellia 800 mg 2-3 times per day, example brand Lisette's, natural anti inflammatory herb    Sleep? modulation- melatonin at night 5 to 10 mg 30 minutes prior to sleep    Feverfew Tea 1-3 cups per day. Can get from [employer] or tenzingmomo.com- A Natural anti inflammatory approach that does not cause further sensitization of the system.    Magnesium Citrate 250 mg daily, slowly increase up to 500-1000 mg daily. Side effect may include diarrhea, so we recommend stopping at whatever dose is comfortable for your body without causing this side effect. This supplement can be very helpful for preventing headache, preventing migraine aura, calming nerves, muscles and even mood. Recommend starting slowly, as it can also lead to increased bowel movements or diarrhea.?

## 2022-10-13 NOTE — PROGRESS NOTES
[facility] NEUROLOGY  [facility], [place]    Date: 10/13/22  Patient Name: Clement Sweet   MRN: [id number]   PCP: Crispin Persaud  Referring Provider: No ref. provider found    Chief Complaint: Follow up  Subjective:        HPI:   Mr. Clement Sweet is a 67 y.o. male presenting for follow up migraine. Patient notes that he has infrequent migraines and does not remember his last headache. He often goes 2-3 months without migraines. He has not been taking topiramate as his headaches have reduced frequency.    No new neurological complaints.  =============================================  Per documentation by Dr. Jung Lind DO on 11/12/21:  Mr. Sweet presents today to discuss his increasing migraine frequency.  The patient reports that he has suffered with migraines for several decades.  They have been generally well controlled on topiramate 100 mg once daily for number of years.  Over the course of the past 1+ year, he has noted a slow increase in the number of total headache days per month.  He knows that he is currently having no significant migraines per month currently as he medicates early with Advil which is very successful in stopping headaches.  He is concerned over the number of days per month that he is having the employee Advil wishes to discuss improving his preventive regimen today.    Denies side effects from topiramate.    Age of onset: 20's  When did they become more of a problem: last 2 years again  # of Total headache days per month: 12-13  # of Migraine days per month: 0; takes medication that helps stop them  Intensity of average and most severe headaches: 5/10, 7/10  Duration of headache pain: 6  Quality of pain: Pulsating/Throbbing, now having some occasional sharp pains  Laterality: bilateral, sometimes just right side  Location: center of head, right temporal  Photophobia and phonophobia: yes in the past  Nausea and vomiting: yes in the past  Causes avoidance of physical  activity: yes in the past  Worsened by physical activity: yes in the past  Preventive Medications failed: topiramate (well controlled with 100mg BID for a number of years  Acute medications failed: unsure  OTC Medications: advil (10-12 days per month)  Hx of (Bolded items are positive): depression, anxiety, fibromyalgia, autoimmune disorder, head trauma, neurological infection, glaucoma, asthma, nephrolithiasis, MI, Stroke  Is medication overuse contributing to the patient's current migraine burden: no; only recently increased use of OTC meds  Aura: none  Autonomic symptoms: none    Latest Imaging: reports normal     Current HA Regimen:  topiramate 100mg daily  advil prn    PAST MEDICAL HISTORY:  Past Medical History:   Diagnosis Date    DM (diabetes mellitus)     ED (erectile dysfunction)     HTN (hypertension)     Metformin adverse reaction 7/30/2021    Type 2 diabetes mellitus        PAST SURGICAL HISTORY:  History reviewed. No pertinent surgical history.    CURRENT MEDS:  Current Outpatient Medications   Medication Sig Dispense Refill    atorvastatin (LIPITOR) 20 MG tablet Take 1 tablet (20 mg total) by mouth once daily. 90 tablet 3    azelastine (ASTELIN) 137 mcg (0.1 %) nasal spray USE 1 TO 2 SPRAY(S) IN EACH NOSTRIL TWICE DAILY      dorzolamide-timolol 2-0.5% (COSOPT) 22.3-6.8 mg/mL ophthalmic solution Apply 1 drop to eye.      fluticasone propionate (FLONASE) 50 mcg/actuation nasal spray       losartan (COZAAR) 50 MG tablet Take 1 tablet (50 mg total) by mouth once daily. 90 tablet 3    metFORMIN (GLUCOPHAGE-XR) 500 MG ER 24hr tablet Take 1 tablet (500 mg total) by mouth daily with breakfast. 90 tablet 3    tadalafiL (CIALIS) 20 MG Tab Take 1 tablet (20 mg total) by mouth once daily. Take 1 hour before intercourse 10 tablet 11    timoloL 0.25 % ophthalmic solution Place 2 drops into both eyes 2 (two) times daily.      topiramate (TOPAMAX) 50 MG tablet Take 3 tablets (150 mg total) by mouth once daily. 90  "tablet 11    latanoprost 0.005 % ophthalmic solution Place into both eyes.      tamsulosin (FLOMAX) 0.4 mg Cap Take 1 capsule (0.4 mg total) by mouth once daily. (Patient not taking: Reported on 9/27/2022) 90 capsule 3     No current facility-administered medications for this visit.       ALLERGIES:  Review of patient's allergies indicates:   Allergen Reactions    Enalapril Swelling     Lips swell       FAMILY HISTORY:  Family History   Problem Relation Age of Onset    Hypertension Mother     Diabetes Mother     Cancer Father     Diabetes Brother     Cirrhosis Neg Hx        SOCIAL HISTORY:  Social History     Tobacco Use    Smoking status: Former    Smokeless tobacco: Never   Substance Use Topics    Alcohol use: Not Currently    Drug use: Never       Review of Systems:  Gen: no fever, no chills, no generalized feeling of weakness   HEENT: no double vision, no blurred vision, no eye pain, no eye exudates. no nasal congestion,   no traumatic injury of head, no neck pain, no neck stiffness. no photophobia or phonophobia at this time. ?    Heart: no chest pain, no SOB    Lungs: no SOB, no cough    MSK: no weakness of legs, intact ROM    ABD: no abd pain, no N/V/D/C, no difficulty with defecation.    Extremities: No leg pain, no edema.       Objective:     Vitals:    10/13/22 1405   BP: (!) 149/79   Pulse: (!) 49   Weight: 92.1 kg (203 lb)   Height: 5' 8" (1.727 m)       General: Male in NAD, alert and awake, Aox3, well groomed. ?    ? ?    HEENT: Head is NC/AT EOMI, pupil size: 4 mm B/L, no nystagmus noted; hearing grossly intact b/l.      Neck: Supple. no nuchal rigidity.      Cardiovascular: well perfused, no cyanosis        Respiratory: Symmetric chest rise noted       Musculoskeletal: Muscle tone noted to be adequate for patient age, muscle mass is WNL. No spontaneous movements or fasciculations noted during this examination.       Extremities: No pedal edema or calf tenderness. No cogwheel rigidity noted on B/L UE " extremities.       Neurological Examination.    Mental status: AA&O x3; Affect/mood is euthymic/congruent; no aphasia noted during examination. Patient answers simple questions appropriately & follows simple commands; no dysarthria or expressive aphasia; no sonja-neglect or extinction. Vocabulary/word finding: excellent.       Cranial Nerves: II-XII grossly intact.       Muscle Function: Tone WNL and Muscle bulk WNL. Symmetric use of bilateral upper and lower extremities against gravity.     Gait: adequate casual gait with stride length and arm swing WNL.     Assessment:   Clement Sweet is a 67 y.o. male presenting for follow up episodic migraine. Given the low frequency of his migraines, no preventative medications are necessary at this time. Patient may utilize over the counter medications as needed for headache relief, but was counseled to limit usage to less than 10 days per month.    Plan:     Problem List Items Addressed This Visit          Neuro    Migraine without aura and without status migrainosus, not intractable - Primary         1. Preventive medications:    No preventative medications indicated at this time.    2. Acute (abortive) medications:    Continue:  Use of OTC analgesics such as ibuprofen given their success thus far.  Avoid use of these medications more than 10 days per month as this may potentiate medication overuse headaches.      3. Natural approaches to increasing brain energy and serotonin:    SAMe 200 mg a day    Vitamin B2 400 mg daily    Vitamin B12 1000 mcg daily    Getting early morning light to increase natural serotonin, melatonin. Could also consider light therapy box, 10,000 LUX.     4. Headache prevention and acute treatment over-the counter supplements    Boswellia 800 mg 2-3 times per day, example brand Knox's, natural anti inflammatory herb    Sleep? modulation- melatonin at night 5 to 10 mg 30 minutes prior to sleep    Feverfew Tea 1-3 cups per day. Can get from  buddhateas.com or tenzingmomo.com- A Natural anti inflammatory approach that does not cause further sensitization of the system.    Magnesium Citrate 250 mg daily, slowly increase up to 500-1000 mg daily. Side effect may include diarrhea, so we recommend stopping at whatever dose is comfortable for your body without causing this side effect. This supplement can be very helpful for preventing headache, preventing migraine aura, calming nerves, muscles and even mood. Recommend starting slowly, as it can also lead to increased bowel movements or diarrhea.?     Follow up as needed.    I spent a total of 35 minutes on the day of the visit. This includes face to face time and non-face to face time preparing to see the patient (eg, review of tests), obtaining and/or reviewing separately obtained history, documenting clinical information in the electronic or other health record, independently interpreting results and communicating results to the patient/family/caregiver, or care coordinator. Jung Lind DO was available in clinic throughout this encounter.     Ida Sanchez PA-C

## 2022-11-18 ENCOUNTER — PATIENT OUTREACH (OUTPATIENT)
Dept: ADMINISTRATIVE | Facility: HOSPITAL | Age: 67
End: 2022-11-18
Payer: MEDICARE

## 2022-12-27 ENCOUNTER — TELEPHONE (OUTPATIENT)
Dept: INTERNAL MEDICINE | Facility: CLINIC | Age: 67
End: 2022-12-27
Payer: MEDICARE

## 2022-12-27 DIAGNOSIS — R80.9 TYPE 2 DIABETES MELLITUS WITH MICROALBUMINURIA, WITHOUT LONG-TERM CURRENT USE OF INSULIN: Primary | ICD-10-CM

## 2022-12-27 DIAGNOSIS — E11.29 TYPE 2 DIABETES MELLITUS WITH MICROALBUMINURIA, WITHOUT LONG-TERM CURRENT USE OF INSULIN: Primary | ICD-10-CM

## 2022-12-28 DIAGNOSIS — E11.9 TYPE 2 DIABETES MELLITUS WITHOUT COMPLICATION: ICD-10-CM

## 2023-01-06 ENCOUNTER — PES CALL (OUTPATIENT)
Dept: ADMINISTRATIVE | Facility: CLINIC | Age: 68
End: 2023-01-06
Payer: MEDICARE

## 2023-01-09 ENCOUNTER — LAB VISIT (OUTPATIENT)
Dept: LAB | Facility: HOSPITAL | Age: 68
End: 2023-01-09
Attending: FAMILY MEDICINE
Payer: MEDICARE

## 2023-01-09 DIAGNOSIS — R80.9 TYPE 2 DIABETES MELLITUS WITH MICROALBUMINURIA, WITHOUT LONG-TERM CURRENT USE OF INSULIN: ICD-10-CM

## 2023-01-09 DIAGNOSIS — E11.29 TYPE 2 DIABETES MELLITUS WITH MICROALBUMINURIA, WITHOUT LONG-TERM CURRENT USE OF INSULIN: ICD-10-CM

## 2023-01-09 DIAGNOSIS — E11.9 TYPE 2 DIABETES MELLITUS WITHOUT COMPLICATION: ICD-10-CM

## 2023-01-09 LAB
ANION GAP SERPL CALC-SCNC: 8 MMOL/L (ref 8–16)
BUN SERPL-MCNC: 13 MG/DL (ref 8–23)
CALCIUM SERPL-MCNC: 10.2 MG/DL (ref 8.7–10.5)
CHLORIDE SERPL-SCNC: 102 MMOL/L (ref 95–110)
CHOLEST SERPL-MCNC: 116 MG/DL (ref 120–199)
CHOLEST/HDLC SERPL: 2.6 {RATIO} (ref 2–5)
CO2 SERPL-SCNC: 29 MMOL/L (ref 23–29)
CREAT SERPL-MCNC: 1 MG/DL (ref 0.5–1.4)
EST. GFR  (NO RACE VARIABLE): >60 ML/MIN/1.73 M^2
ESTIMATED AVG GLUCOSE: 157 MG/DL (ref 68–131)
GLUCOSE SERPL-MCNC: 142 MG/DL (ref 70–110)
HBA1C MFR BLD: 7.1 % (ref 4–5.6)
HDLC SERPL-MCNC: 45 MG/DL (ref 40–75)
HDLC SERPL: 38.8 % (ref 20–50)
LDLC SERPL CALC-MCNC: 62 MG/DL (ref 63–159)
NONHDLC SERPL-MCNC: 71 MG/DL
POTASSIUM SERPL-SCNC: 5 MMOL/L (ref 3.5–5.1)
SODIUM SERPL-SCNC: 139 MMOL/L (ref 136–145)
TRIGL SERPL-MCNC: 45 MG/DL (ref 30–150)

## 2023-01-09 PROCEDURE — 83036 HEMOGLOBIN GLYCOSYLATED A1C: CPT | Performed by: FAMILY MEDICINE

## 2023-01-09 PROCEDURE — 36415 COLL VENOUS BLD VENIPUNCTURE: CPT | Performed by: FAMILY MEDICINE

## 2023-01-09 PROCEDURE — 80061 LIPID PANEL: CPT | Performed by: FAMILY MEDICINE

## 2023-01-09 PROCEDURE — 80048 BASIC METABOLIC PNL TOTAL CA: CPT | Performed by: FAMILY MEDICINE

## 2023-02-07 ENCOUNTER — PES CALL (OUTPATIENT)
Dept: ADMINISTRATIVE | Facility: CLINIC | Age: 68
End: 2023-02-07
Payer: MEDICARE

## 2023-02-20 ENCOUNTER — PATIENT MESSAGE (OUTPATIENT)
Dept: ADMINISTRATIVE | Facility: CLINIC | Age: 68
End: 2023-02-20
Payer: MEDICARE

## 2023-02-22 ENCOUNTER — PES CALL (OUTPATIENT)
Dept: ADMINISTRATIVE | Facility: CLINIC | Age: 68
End: 2023-02-22
Payer: MEDICARE

## 2023-03-01 ENCOUNTER — TELEPHONE (OUTPATIENT)
Dept: ADMINISTRATIVE | Facility: CLINIC | Age: 68
End: 2023-03-01
Payer: MEDICARE

## 2023-03-01 ENCOUNTER — PATIENT MESSAGE (OUTPATIENT)
Dept: ADMINISTRATIVE | Facility: CLINIC | Age: 68
End: 2023-03-01
Payer: MEDICARE

## 2023-03-03 ENCOUNTER — TELEPHONE (OUTPATIENT)
Dept: ADMINISTRATIVE | Facility: CLINIC | Age: 68
End: 2023-03-03
Payer: MEDICARE

## 2023-03-03 NOTE — TELEPHONE ENCOUNTER
Called pt per provider due to provider stating pt was having issues with his virtual appt; called pt and pt stated even though he was allowing access for his camera it still was not working; walked pt through the steps to see if we could work around the issue but was not able to resolve the issue; pt stated to just reschedule appt to in office appt; appt rescheduled for 4/26/23 per pt request due to having tech issues.

## 2023-03-03 NOTE — TELEPHONE ENCOUNTER
Called pt; informed pt I was just making a reminder call for his virtual visit today at 8:30am and to see if he needed any help; pt stated he didn't need any help; pt informed to login 15 minutes prior to appt time

## 2023-04-05 ENCOUNTER — PATIENT MESSAGE (OUTPATIENT)
Dept: UROLOGY | Facility: CLINIC | Age: 68
End: 2023-04-05
Payer: MEDICARE

## 2023-04-17 ENCOUNTER — LAB VISIT (OUTPATIENT)
Dept: LAB | Facility: HOSPITAL | Age: 68
End: 2023-04-17
Payer: MEDICARE

## 2023-04-17 ENCOUNTER — OFFICE VISIT (OUTPATIENT)
Dept: UROLOGY | Facility: CLINIC | Age: 68
End: 2023-04-17
Payer: MEDICARE

## 2023-04-17 VITALS
WEIGHT: 202.81 LBS | BODY MASS INDEX: 30.04 KG/M2 | DIASTOLIC BLOOD PRESSURE: 71 MMHG | SYSTOLIC BLOOD PRESSURE: 138 MMHG | HEART RATE: 52 BPM | HEIGHT: 69 IN

## 2023-04-17 DIAGNOSIS — N52.1 ERECTILE DYSFUNCTION DUE TO DISEASES CLASSIFIED ELSEWHERE: ICD-10-CM

## 2023-04-17 DIAGNOSIS — N13.8 BPH WITH OBSTRUCTION/LOWER URINARY TRACT SYMPTOMS: Primary | ICD-10-CM

## 2023-04-17 DIAGNOSIS — N40.1 BPH WITH OBSTRUCTION/LOWER URINARY TRACT SYMPTOMS: Primary | ICD-10-CM

## 2023-04-17 DIAGNOSIS — E11.29 TYPE 2 DIABETES MELLITUS WITH MICROALBUMINURIA, WITHOUT LONG-TERM CURRENT USE OF INSULIN: ICD-10-CM

## 2023-04-17 DIAGNOSIS — N13.8 BPH WITH OBSTRUCTION/LOWER URINARY TRACT SYMPTOMS: ICD-10-CM

## 2023-04-17 DIAGNOSIS — R80.9 TYPE 2 DIABETES MELLITUS WITH MICROALBUMINURIA, WITHOUT LONG-TERM CURRENT USE OF INSULIN: ICD-10-CM

## 2023-04-17 DIAGNOSIS — N40.1 BPH WITH OBSTRUCTION/LOWER URINARY TRACT SYMPTOMS: ICD-10-CM

## 2023-04-17 LAB
BILIRUB SERPL-MCNC: NORMAL MG/DL
BLOOD URINE, POC: NORMAL
CLARITY, POC UA: CLEAR
COLOR, POC UA: YELLOW
COMPLEXED PSA SERPL-MCNC: 0.43 NG/ML (ref 0–4)
GLUCOSE UR QL STRIP: NORMAL
KETONES UR QL STRIP: NORMAL
LEUKOCYTE ESTERASE URINE, POC: NORMAL
NITRITE, POC UA: NORMAL
PH, POC UA: 5
PROTEIN, POC: NORMAL
SPECIFIC GRAVITY, POC UA: 1015
UROBILINOGEN, POC UA: NORMAL

## 2023-04-17 PROCEDURE — 3075F SYST BP GE 130 - 139MM HG: CPT | Mod: CPTII,S$GLB,, | Performed by: NURSE PRACTITIONER

## 2023-04-17 PROCEDURE — 3008F PR BODY MASS INDEX (BMI) DOCUMENTED: ICD-10-PCS | Mod: CPTII,S$GLB,, | Performed by: NURSE PRACTITIONER

## 2023-04-17 PROCEDURE — 81002 URINALYSIS NONAUTO W/O SCOPE: CPT | Mod: S$GLB,,, | Performed by: NURSE PRACTITIONER

## 2023-04-17 PROCEDURE — 3288F PR FALLS RISK ASSESSMENT DOCUMENTED: ICD-10-PCS | Mod: CPTII,S$GLB,, | Performed by: NURSE PRACTITIONER

## 2023-04-17 PROCEDURE — 1126F PR PAIN SEVERITY QUANTIFIED, NO PAIN PRESENT: ICD-10-PCS | Mod: CPTII,S$GLB,, | Performed by: NURSE PRACTITIONER

## 2023-04-17 PROCEDURE — 1101F PR PT FALLS ASSESS DOC 0-1 FALLS W/OUT INJ PAST YR: ICD-10-PCS | Mod: CPTII,S$GLB,, | Performed by: NURSE PRACTITIONER

## 2023-04-17 PROCEDURE — 3060F PR POS MICROALBUMINURIA RESULT DOCUMENTED/REVIEW: ICD-10-PCS | Mod: CPTII,S$GLB,, | Performed by: NURSE PRACTITIONER

## 2023-04-17 PROCEDURE — 1160F PR REVIEW ALL MEDS BY PRESCRIBER/CLIN PHARMACIST DOCUMENTED: ICD-10-PCS | Mod: CPTII,S$GLB,, | Performed by: NURSE PRACTITIONER

## 2023-04-17 PROCEDURE — 3075F PR MOST RECENT SYSTOLIC BLOOD PRESS GE 130-139MM HG: ICD-10-PCS | Mod: CPTII,S$GLB,, | Performed by: NURSE PRACTITIONER

## 2023-04-17 PROCEDURE — 3051F PR MOST RECENT HEMOGLOBIN A1C LEVEL 7.0 - < 8.0%: ICD-10-PCS | Mod: CPTII,S$GLB,, | Performed by: NURSE PRACTITIONER

## 2023-04-17 PROCEDURE — 99214 OFFICE O/P EST MOD 30 MIN: CPT | Mod: S$GLB,,, | Performed by: NURSE PRACTITIONER

## 2023-04-17 PROCEDURE — 3288F FALL RISK ASSESSMENT DOCD: CPT | Mod: CPTII,S$GLB,, | Performed by: NURSE PRACTITIONER

## 2023-04-17 PROCEDURE — 3066F NEPHROPATHY DOC TX: CPT | Mod: CPTII,S$GLB,, | Performed by: NURSE PRACTITIONER

## 2023-04-17 PROCEDURE — 99999 PR PBB SHADOW E&M-EST. PATIENT-LVL III: CPT | Mod: PBBFAC,,, | Performed by: NURSE PRACTITIONER

## 2023-04-17 PROCEDURE — 36415 COLL VENOUS BLD VENIPUNCTURE: CPT | Performed by: NURSE PRACTITIONER

## 2023-04-17 PROCEDURE — 4010F ACE/ARB THERAPY RXD/TAKEN: CPT | Mod: CPTII,S$GLB,, | Performed by: NURSE PRACTITIONER

## 2023-04-17 PROCEDURE — 99999 PR PBB SHADOW E&M-EST. PATIENT-LVL III: ICD-10-PCS | Mod: PBBFAC,,, | Performed by: NURSE PRACTITIONER

## 2023-04-17 PROCEDURE — 1160F RVW MEDS BY RX/DR IN RCRD: CPT | Mod: CPTII,S$GLB,, | Performed by: NURSE PRACTITIONER

## 2023-04-17 PROCEDURE — 84153 ASSAY OF PSA TOTAL: CPT | Performed by: NURSE PRACTITIONER

## 2023-04-17 PROCEDURE — 1159F PR MEDICATION LIST DOCUMENTED IN MEDICAL RECORD: ICD-10-PCS | Mod: CPTII,S$GLB,, | Performed by: NURSE PRACTITIONER

## 2023-04-17 PROCEDURE — 3066F PR DOCUMENTATION OF TREATMENT FOR NEPHROPATHY: ICD-10-PCS | Mod: CPTII,S$GLB,, | Performed by: NURSE PRACTITIONER

## 2023-04-17 PROCEDURE — 3078F DIAST BP <80 MM HG: CPT | Mod: CPTII,S$GLB,, | Performed by: NURSE PRACTITIONER

## 2023-04-17 PROCEDURE — 1159F MED LIST DOCD IN RCRD: CPT | Mod: CPTII,S$GLB,, | Performed by: NURSE PRACTITIONER

## 2023-04-17 PROCEDURE — 1126F AMNT PAIN NOTED NONE PRSNT: CPT | Mod: CPTII,S$GLB,, | Performed by: NURSE PRACTITIONER

## 2023-04-17 PROCEDURE — 3078F PR MOST RECENT DIASTOLIC BLOOD PRESSURE < 80 MM HG: ICD-10-PCS | Mod: CPTII,S$GLB,, | Performed by: NURSE PRACTITIONER

## 2023-04-17 PROCEDURE — 3008F BODY MASS INDEX DOCD: CPT | Mod: CPTII,S$GLB,, | Performed by: NURSE PRACTITIONER

## 2023-04-17 PROCEDURE — 81002 POCT URINE DIPSTICK WITHOUT MICROSCOPE: ICD-10-PCS | Mod: S$GLB,,, | Performed by: NURSE PRACTITIONER

## 2023-04-17 PROCEDURE — 3060F POS MICROALBUMINURIA REV: CPT | Mod: CPTII,S$GLB,, | Performed by: NURSE PRACTITIONER

## 2023-04-17 PROCEDURE — 1101F PT FALLS ASSESS-DOCD LE1/YR: CPT | Mod: CPTII,S$GLB,, | Performed by: NURSE PRACTITIONER

## 2023-04-17 PROCEDURE — 99214 PR OFFICE/OUTPT VISIT, EST, LEVL IV, 30-39 MIN: ICD-10-PCS | Mod: S$GLB,,, | Performed by: NURSE PRACTITIONER

## 2023-04-17 PROCEDURE — 3051F HG A1C>EQUAL 7.0%<8.0%: CPT | Mod: CPTII,S$GLB,, | Performed by: NURSE PRACTITIONER

## 2023-04-17 PROCEDURE — 4010F PR ACE/ARB THEARPY RXD/TAKEN: ICD-10-PCS | Mod: CPTII,S$GLB,, | Performed by: NURSE PRACTITIONER

## 2023-04-17 RX ORDER — TAMSULOSIN HYDROCHLORIDE 0.4 MG/1
0.4 CAPSULE ORAL DAILY
Qty: 90 CAPSULE | Refills: 3 | Status: SHIPPED | OUTPATIENT
Start: 2023-04-17 | End: 2024-04-16

## 2023-04-17 NOTE — PROGRESS NOTES
CHIEF COMPLAINT:    Mr. Sweet is a 68 y.o. male presenting for annual visit.      PRESENTING ILLNESS:    Clement Sweet is a 68 y.o. male with a PMH of htn, hld, ed, diabetes mellitus type 2, NAFLD, karla who presents for annual visit.    Established patient to urology department.  He reports a weak urinary stream and complete bladder emptying.  Previously prescribed tamsulosin which he stopped taking.  Just recently restarted taking medication about 6 weeks ago.  Feels tamsulosin does improve urinary stream.  Also reports having nocturia 2-3 times per night.  Discussed behavioral modifications and avoidance of bladder irritants.    Erectile dysfunction > 1yr.  Prescribed cialis, but not working well for him.  Another provider prescribed viagra 50 mg which he has not tried.    Believes there is prostate cancer in the family with cousin or uncle.  Will obtain PSA today.  VIOLET wnl.    REVIEW OF SYSTEMS:    Review of Systems   Constitutional:  Negative for chills and fever.   Respiratory:  Negative for shortness of breath.    Cardiovascular:  Negative for chest pain.   Gastrointestinal:  Negative for constipation and diarrhea.   Genitourinary:  Positive for frequency. Negative for dysuria, flank pain, hematuria and urgency.        Nocturia  Weak stream   Neurological:  Negative for dizziness and weakness.     PATIENT HISTORY:    Past Medical History:   Diagnosis Date    DM (diabetes mellitus)     ED (erectile dysfunction)     HTN (hypertension)     Metformin adverse reaction 7/30/2021    Type 2 diabetes mellitus        Family History   Problem Relation Age of Onset    Hypertension Mother     Diabetes Mother     Cancer Father     Diabetes Brother     Cirrhosis Neg Hx        Allergies:  Enalapril    Medications:    Current Outpatient Medications:     fluticasone propionate (FLONASE) 50 mcg/actuation nasal spray, , Disp: , Rfl:     losartan (COZAAR) 50 MG tablet, Take 1 tablet (50 mg total) by mouth once daily., Disp: 90  tablet, Rfl: 3    atorvastatin (LIPITOR) 20 MG tablet, Take 1 tablet (20 mg total) by mouth once daily., Disp: 90 tablet, Rfl: 3    azelastine (ASTELIN) 137 mcg (0.1 %) nasal spray, USE 1 TO 2 SPRAY(S) IN EACH NOSTRIL TWICE DAILY, Disp: , Rfl:     dorzolamide-timolol 2-0.5% (COSOPT) 22.3-6.8 mg/mL ophthalmic solution, Apply 1 drop to eye., Disp: , Rfl:     latanoprost 0.005 % ophthalmic solution, Place into both eyes., Disp: , Rfl:     metFORMIN (GLUCOPHAGE-XR) 500 MG ER 24hr tablet, Take 1 tablet (500 mg total) by mouth daily with breakfast. (Patient not taking: Reported on 3/3/2023), Disp: 90 tablet, Rfl: 3    tamsulosin (FLOMAX) 0.4 mg Cap, Take 1 capsule (0.4 mg total) by mouth once daily., Disp: 90 capsule, Rfl: 3    timoloL 0.25 % ophthalmic solution, Place 2 drops into both eyes 2 (two) times daily., Disp: , Rfl:     topiramate (TOPAMAX) 50 MG tablet, Take 3 tablets (150 mg total) by mouth once daily., Disp: 90 tablet, Rfl: 11    PHYSICAL EXAMINATION:    Physical Exam  Vitals and nursing note reviewed.   Constitutional:       Appearance: Normal appearance. He is well-developed.   HENT:      Head: Normocephalic and atraumatic.   Eyes:      Pupils: Pupils are equal, round, and reactive to light.   Pulmonary:      Effort: Pulmonary effort is normal.   Genitourinary:     Penis: Normal.       Testes: Normal.      Prostate: Enlarged. Not tender.      Rectum: Normal.      Comments: No nodules felt, smooth prostate  Musculoskeletal:         General: Normal range of motion.      Cervical back: Normal range of motion.   Skin:     General: Skin is warm and dry.   Neurological:      Mental Status: He is alert and oriented to person, place, and time.   Psychiatric:         Behavior: Behavior normal.         LABS:    U/a performed in office today: yellow, ph 5, 1.015, trace protein, otherwise unremarkable    Lab Results   Component Value Date    PSADIAG 0.35 04/26/2022    PSADIAG 0.37 04/07/2021        IMPRESSION:  Encounter Diagnoses   Name Primary?    BPH with obstruction/lower urinary tract symptoms Yes    Type 2 diabetes mellitus with microalbuminuria, without long-term current use of insulin     Erectile dysfunction due to diseases classified elsewhere          PLAN:  Problem List Items Addressed This Visit       BPH with obstruction/lower urinary tract symptoms - Primary    Relevant Medications    tamsulosin (FLOMAX) 0.4 mg Cap    Other Relevant Orders    PROSTATE SPECIFIC ANTIGEN, DIAGNOSTIC    POCT URINE DIPSTICK WITHOUT MICROSCOPE    Erectile dysfunction    Type 2 diabetes mellitus with microalbuminuria, without long-term current use of insulin       1. Bph with nocturia/luts   - continue tamsulosin, new script sent   - obtain psa   - shaina completed today  2. Erectile dysfunction   - trial viagra, if does not work will contact provider for script of cialis  3. Rtc in 1 yr or sooner prn    Berenice Mejia NP    I spent 30 minutes with the patient. Over 50% of the visit was spent in counseling.

## 2023-04-25 ENCOUNTER — TELEPHONE (OUTPATIENT)
Dept: ADMINISTRATIVE | Facility: CLINIC | Age: 68
End: 2023-04-25
Payer: MEDICARE

## 2023-04-26 ENCOUNTER — PATIENT MESSAGE (OUTPATIENT)
Dept: PHARMACY | Facility: CLINIC | Age: 68
End: 2023-04-26
Payer: MEDICARE

## 2023-04-26 ENCOUNTER — OFFICE VISIT (OUTPATIENT)
Dept: INTERNAL MEDICINE | Facility: CLINIC | Age: 68
End: 2023-04-26
Payer: MEDICARE

## 2023-04-26 VITALS
WEIGHT: 207.25 LBS | OXYGEN SATURATION: 98 % | SYSTOLIC BLOOD PRESSURE: 136 MMHG | BODY MASS INDEX: 30.6 KG/M2 | DIASTOLIC BLOOD PRESSURE: 72 MMHG | HEART RATE: 56 BPM

## 2023-04-26 DIAGNOSIS — N40.1 BPH WITH OBSTRUCTION/LOWER URINARY TRACT SYMPTOMS: ICD-10-CM

## 2023-04-26 DIAGNOSIS — N13.8 BPH WITH OBSTRUCTION/LOWER URINARY TRACT SYMPTOMS: ICD-10-CM

## 2023-04-26 DIAGNOSIS — E11.69 HYPERLIPIDEMIA ASSOCIATED WITH TYPE 2 DIABETES MELLITUS: ICD-10-CM

## 2023-04-26 DIAGNOSIS — Z00.00 ENCOUNTER FOR PREVENTIVE HEALTH EXAMINATION: Primary | ICD-10-CM

## 2023-04-26 DIAGNOSIS — E11.9 ENCOUNTER FOR DIABETIC FOOT EXAM: ICD-10-CM

## 2023-04-26 DIAGNOSIS — E66.09 CLASS 1 OBESITY DUE TO EXCESS CALORIES WITH SERIOUS COMORBIDITY AND BODY MASS INDEX (BMI) OF 30.0 TO 30.9 IN ADULT: ICD-10-CM

## 2023-04-26 DIAGNOSIS — E11.29 TYPE 2 DIABETES MELLITUS WITH MICROALBUMINURIA, WITHOUT LONG-TERM CURRENT USE OF INSULIN: ICD-10-CM

## 2023-04-26 DIAGNOSIS — R80.9 TYPE 2 DIABETES MELLITUS WITH MICROALBUMINURIA, WITHOUT LONG-TERM CURRENT USE OF INSULIN: ICD-10-CM

## 2023-04-26 DIAGNOSIS — E78.5 HYPERLIPIDEMIA ASSOCIATED WITH TYPE 2 DIABETES MELLITUS: ICD-10-CM

## 2023-04-26 PROCEDURE — 3288F PR FALLS RISK ASSESSMENT DOCUMENTED: ICD-10-PCS | Mod: CPTII,S$GLB,, | Performed by: NURSE PRACTITIONER

## 2023-04-26 PROCEDURE — 4010F ACE/ARB THERAPY RXD/TAKEN: CPT | Mod: CPTII,S$GLB,, | Performed by: NURSE PRACTITIONER

## 2023-04-26 PROCEDURE — 4010F PR ACE/ARB THEARPY RXD/TAKEN: ICD-10-PCS | Mod: CPTII,S$GLB,, | Performed by: NURSE PRACTITIONER

## 2023-04-26 PROCEDURE — 3051F HG A1C>EQUAL 7.0%<8.0%: CPT | Mod: CPTII,S$GLB,, | Performed by: NURSE PRACTITIONER

## 2023-04-26 PROCEDURE — 3060F PR POS MICROALBUMINURIA RESULT DOCUMENTED/REVIEW: ICD-10-PCS | Mod: CPTII,S$GLB,, | Performed by: NURSE PRACTITIONER

## 2023-04-26 PROCEDURE — 1159F MED LIST DOCD IN RCRD: CPT | Mod: CPTII,S$GLB,, | Performed by: NURSE PRACTITIONER

## 2023-04-26 PROCEDURE — 3066F PR DOCUMENTATION OF TREATMENT FOR NEPHROPATHY: ICD-10-PCS | Mod: CPTII,S$GLB,, | Performed by: NURSE PRACTITIONER

## 2023-04-26 PROCEDURE — G0439 PR MEDICARE ANNUAL WELLNESS SUBSEQUENT VISIT: ICD-10-PCS | Mod: S$GLB,,, | Performed by: NURSE PRACTITIONER

## 2023-04-26 PROCEDURE — 3008F BODY MASS INDEX DOCD: CPT | Mod: CPTII,S$GLB,, | Performed by: NURSE PRACTITIONER

## 2023-04-26 PROCEDURE — 1170F PR FUNCTIONAL STATUS ASSESSED: ICD-10-PCS | Mod: CPTII,S$GLB,, | Performed by: NURSE PRACTITIONER

## 2023-04-26 PROCEDURE — 3078F PR MOST RECENT DIASTOLIC BLOOD PRESSURE < 80 MM HG: ICD-10-PCS | Mod: CPTII,S$GLB,, | Performed by: NURSE PRACTITIONER

## 2023-04-26 PROCEDURE — 3060F POS MICROALBUMINURIA REV: CPT | Mod: CPTII,S$GLB,, | Performed by: NURSE PRACTITIONER

## 2023-04-26 PROCEDURE — 3078F DIAST BP <80 MM HG: CPT | Mod: CPTII,S$GLB,, | Performed by: NURSE PRACTITIONER

## 2023-04-26 PROCEDURE — 1101F PR PT FALLS ASSESS DOC 0-1 FALLS W/OUT INJ PAST YR: ICD-10-PCS | Mod: CPTII,S$GLB,, | Performed by: NURSE PRACTITIONER

## 2023-04-26 PROCEDURE — 99999 PR PBB SHADOW E&M-EST. PATIENT-LVL III: ICD-10-PCS | Mod: PBBFAC,,, | Performed by: NURSE PRACTITIONER

## 2023-04-26 PROCEDURE — 3288F FALL RISK ASSESSMENT DOCD: CPT | Mod: CPTII,S$GLB,, | Performed by: NURSE PRACTITIONER

## 2023-04-26 PROCEDURE — 3051F PR MOST RECENT HEMOGLOBIN A1C LEVEL 7.0 - < 8.0%: ICD-10-PCS | Mod: CPTII,S$GLB,, | Performed by: NURSE PRACTITIONER

## 2023-04-26 PROCEDURE — 1160F RVW MEDS BY RX/DR IN RCRD: CPT | Mod: CPTII,S$GLB,, | Performed by: NURSE PRACTITIONER

## 2023-04-26 PROCEDURE — 1159F PR MEDICATION LIST DOCUMENTED IN MEDICAL RECORD: ICD-10-PCS | Mod: CPTII,S$GLB,, | Performed by: NURSE PRACTITIONER

## 2023-04-26 PROCEDURE — 1101F PT FALLS ASSESS-DOCD LE1/YR: CPT | Mod: CPTII,S$GLB,, | Performed by: NURSE PRACTITIONER

## 2023-04-26 PROCEDURE — 3075F PR MOST RECENT SYSTOLIC BLOOD PRESS GE 130-139MM HG: ICD-10-PCS | Mod: CPTII,S$GLB,, | Performed by: NURSE PRACTITIONER

## 2023-04-26 PROCEDURE — 3066F NEPHROPATHY DOC TX: CPT | Mod: CPTII,S$GLB,, | Performed by: NURSE PRACTITIONER

## 2023-04-26 PROCEDURE — 3008F PR BODY MASS INDEX (BMI) DOCUMENTED: ICD-10-PCS | Mod: CPTII,S$GLB,, | Performed by: NURSE PRACTITIONER

## 2023-04-26 PROCEDURE — 1170F FXNL STATUS ASSESSED: CPT | Mod: CPTII,S$GLB,, | Performed by: NURSE PRACTITIONER

## 2023-04-26 PROCEDURE — 3075F SYST BP GE 130 - 139MM HG: CPT | Mod: CPTII,S$GLB,, | Performed by: NURSE PRACTITIONER

## 2023-04-26 PROCEDURE — 99999 PR PBB SHADOW E&M-EST. PATIENT-LVL III: CPT | Mod: PBBFAC,,, | Performed by: NURSE PRACTITIONER

## 2023-04-26 PROCEDURE — G0439 PPPS, SUBSEQ VISIT: HCPCS | Mod: S$GLB,,, | Performed by: NURSE PRACTITIONER

## 2023-04-26 PROCEDURE — 1160F PR REVIEW ALL MEDS BY PRESCRIBER/CLIN PHARMACIST DOCUMENTED: ICD-10-PCS | Mod: CPTII,S$GLB,, | Performed by: NURSE PRACTITIONER

## 2023-04-26 NOTE — PROGRESS NOTES
Clement Sweet presented for a  Medicare AWV and comprehensive Health Risk Assessment today. The following components were reviewed and updated:    Medical history  Family History  Social history  Allergies and Current Medications  Health Risk Assessment  Health Maintenance  Care Team         ** See Completed Assessments for Annual Wellness Visit within the encounter summary.**         The following assessments were completed:  Living Situation  CAGE  Depression Screening  Timed Get Up and Go  Whisper Test  Cognitive Function Screening  Nutrition Screening  ADL Screening  PAQ Screening          Vitals:    04/26/23 0833   BP: 136/72   Pulse: (!) 56   SpO2: 98%   Weight: 94 kg (207 lb 3.7 oz)     Body mass index is 30.6 kg/m².  Physical Exam  Vitals and nursing note reviewed.   Constitutional:       Appearance: He is well-developed.   HENT:      Head: Normocephalic and atraumatic.      Right Ear: External ear normal.      Left Ear: External ear normal.   Eyes:      Conjunctiva/sclera: Conjunctivae normal.      Pupils: Pupils are equal, round, and reactive to light.   Cardiovascular:      Rate and Rhythm: Normal rate and regular rhythm.      Pulses:           Dorsalis pedis pulses are 2+ on the right side and 2+ on the left side.        Posterior tibial pulses are 2+ on the right side and 2+ on the left side.   Pulmonary:      Effort: Pulmonary effort is normal.      Breath sounds: Normal breath sounds.   Abdominal:      General: Bowel sounds are normal.      Palpations: Abdomen is soft.   Musculoskeletal:         General: Normal range of motion.      Cervical back: Normal range of motion and neck supple.   Feet:      Right foot:      Protective Sensation: 5 sites tested.  5 sites sensed.      Skin integrity: Skin integrity normal.      Left foot:      Protective Sensation: 5 sites tested.  5 sites sensed.      Skin integrity: Skin integrity normal.   Skin:     General: Skin is warm and dry.   Neurological:      Mental  Status: He is alert and oriented to person, place, and time.             Diagnoses and health risks identified today and associated recommendations/orders:    1. Encounter for preventive health examination  Health Maintenance updated   Records reviewed   Exam done     2. BPH with obstruction/lower urinary tract symptoms  Stable and chronic. Continue current medications. Followed by PCP.     3. Hyperlipidemia associated with type 2 diabetes mellitus  Stable and chronic. Continue current medications. Followed by PCP.     4. Class 1 obesity due to excess calories with serious comorbidity and body mass index (BMI) of 30.0 to 30.9 in adult  BMI reviewed.     5. Encounter for diabetic foot exam  - Foot Exam Performed    6. Type 2 diabetes mellitus with microalbuminuria, without long-term current use of insulin  - Ambulatory referral/consult to Podiatry; Future    Counseling and Referral of Other Preventative  (Italic type indicates deductible and co-insurance are waived)    Patient Name: Clement Sweet  Today's Date: 4/26/2023    Health Maintenance         Date Due Completion Date    Low Dose Statin Never done ---    Hemoglobin A1c 07/09/2023 1/9/2023    Diabetes Urine Screening 01/09/2024 1/9/2023    Lipid Panel 01/09/2024 1/9/2023    Eye Exam 01/17/2024 1/17/2023    Override on 4/16/2021: Done    PROSTATE-SPECIFIC ANTIGEN 04/17/2024 4/17/2023    Foot Exam 04/26/2024 4/26/2023    Override on 4/30/2021: Done    Colorectal Cancer Screening 04/02/2028 4/2/2018 (Done)    Override on 4/2/2018: Done    TETANUS VACCINE 09/20/2032 9/20/2022          Orders Placed This Encounter   Procedures    Ambulatory referral/consult to Podiatry    Foot Exam Performed       Provided Clement with a 5-10 year written screening schedule and personal prevention plan. Recommendations were developed using the USPSTF age appropriate recommendations. Education, counseling, and referrals were provided as needed. After Visit Summary printed and given to  patient which includes a list of additional screenings\tests needed.    Follow up in about 1 year (around 4/26/2024) for medicare annual wellness visit.    Trudy Sandhu NP  I offered to discuss advanced care planning, including how to pick a person who would make decisions for you if you were unable to make them for yourself, called a health care power of , and what kind of decisions you might make such as use of life sustaining treatments such as ventilators and tube feeding when faced with a life limiting illness recorded on a living will that they will need to know. (How you want to be cared for as you near the end of your natural life)     X Patient is interested in learning more about how to make advanced directives.  I provided them paperwork and offered to discuss this with them.  Review for Opioid Screening: Pt does not have Rx for Opioids     Review for Substance Use Disorders: Patient does not use substance

## 2023-04-26 NOTE — PATIENT INSTRUCTIONS
Counseling and Referral of Other Preventative  (Italic type indicates deductible and co-insurance are waived)    Patient Name: Clement Sweet  Today's Date: 4/26/2023    Health Maintenance       Date Due Completion Date    Low Dose Statin Never done ---    Foot Exam 04/30/2022 4/30/2021    Override on 4/30/2021: Done    Hemoglobin A1c 07/09/2023 1/9/2023    Diabetes Urine Screening 01/09/2024 1/9/2023    Lipid Panel 01/09/2024 1/9/2023    Eye Exam 01/17/2024 1/17/2023    Override on 4/16/2021: Done    PROSTATE-SPECIFIC ANTIGEN 04/17/2024 4/17/2023    Colorectal Cancer Screening 04/02/2028 4/2/2018 (Done)    Override on 4/2/2018: Done    TETANUS VACCINE 09/20/2032 9/20/2022        No orders of the defined types were placed in this encounter.      The following information is provided to all patients.  This information is to help you find resources for any of the problems found today that may be affecting your health:                Living healthy guide: www.[facility].[place].gov      Understanding Diabetes: www.diabetes.org      Eating healthy: www.cdc.gov/healthyweight      CDC home safety checklist: www.cdc.gov/steadi/patient.html      Agency on Aging: www.goea.[place].[facility]      Alcoholics anonymous (AA): www.aa.org      Physical Activity: www.tanner.nih.gov/qj0jrxx      Tobacco use: www.quitwithusla.org

## 2023-05-03 ENCOUNTER — OFFICE VISIT (OUTPATIENT)
Dept: PODIATRY | Facility: CLINIC | Age: 68
End: 2023-05-03
Payer: MEDICARE

## 2023-05-03 VITALS
HEART RATE: 66 BPM | DIASTOLIC BLOOD PRESSURE: 82 MMHG | HEIGHT: 69 IN | WEIGHT: 207.13 LBS | BODY MASS INDEX: 30.68 KG/M2 | SYSTOLIC BLOOD PRESSURE: 155 MMHG

## 2023-05-03 DIAGNOSIS — B35.3 TINEA PEDIS OF BOTH FEET: ICD-10-CM

## 2023-05-03 DIAGNOSIS — M79.672 PAIN IN BOTH FEET: Primary | ICD-10-CM

## 2023-05-03 DIAGNOSIS — E11.29 TYPE 2 DIABETES MELLITUS WITH MICROALBUMINURIA, WITHOUT LONG-TERM CURRENT USE OF INSULIN: ICD-10-CM

## 2023-05-03 DIAGNOSIS — M79.671 PAIN IN BOTH FEET: Primary | ICD-10-CM

## 2023-05-03 DIAGNOSIS — R80.9 TYPE 2 DIABETES MELLITUS WITH MICROALBUMINURIA, WITHOUT LONG-TERM CURRENT USE OF INSULIN: ICD-10-CM

## 2023-05-03 PROCEDURE — 3051F PR MOST RECENT HEMOGLOBIN A1C LEVEL 7.0 - < 8.0%: ICD-10-PCS | Mod: CPTII,S$GLB,, | Performed by: PODIATRIST

## 2023-05-03 PROCEDURE — 3288F PR FALLS RISK ASSESSMENT DOCUMENTED: ICD-10-PCS | Mod: CPTII,S$GLB,, | Performed by: PODIATRIST

## 2023-05-03 PROCEDURE — 1101F PR PT FALLS ASSESS DOC 0-1 FALLS W/OUT INJ PAST YR: ICD-10-PCS | Mod: CPTII,S$GLB,, | Performed by: PODIATRIST

## 2023-05-03 PROCEDURE — 1159F MED LIST DOCD IN RCRD: CPT | Mod: CPTII,S$GLB,, | Performed by: PODIATRIST

## 2023-05-03 PROCEDURE — 1101F PT FALLS ASSESS-DOCD LE1/YR: CPT | Mod: CPTII,S$GLB,, | Performed by: PODIATRIST

## 2023-05-03 PROCEDURE — 99999 PR PBB SHADOW E&M-EST. PATIENT-LVL III: ICD-10-PCS | Mod: PBBFAC,,, | Performed by: PODIATRIST

## 2023-05-03 PROCEDURE — 3079F DIAST BP 80-89 MM HG: CPT | Mod: CPTII,S$GLB,, | Performed by: PODIATRIST

## 2023-05-03 PROCEDURE — 99203 PR OFFICE/OUTPT VISIT, NEW, LEVL III, 30-44 MIN: ICD-10-PCS | Mod: S$GLB,,, | Performed by: PODIATRIST

## 2023-05-03 PROCEDURE — 3079F PR MOST RECENT DIASTOLIC BLOOD PRESSURE 80-89 MM HG: ICD-10-PCS | Mod: CPTII,S$GLB,, | Performed by: PODIATRIST

## 2023-05-03 PROCEDURE — 3077F SYST BP >= 140 MM HG: CPT | Mod: CPTII,S$GLB,, | Performed by: PODIATRIST

## 2023-05-03 PROCEDURE — 3066F NEPHROPATHY DOC TX: CPT | Mod: CPTII,S$GLB,, | Performed by: PODIATRIST

## 2023-05-03 PROCEDURE — 4010F PR ACE/ARB THEARPY RXD/TAKEN: ICD-10-PCS | Mod: CPTII,S$GLB,, | Performed by: PODIATRIST

## 2023-05-03 PROCEDURE — 1159F PR MEDICATION LIST DOCUMENTED IN MEDICAL RECORD: ICD-10-PCS | Mod: CPTII,S$GLB,, | Performed by: PODIATRIST

## 2023-05-03 PROCEDURE — 99203 OFFICE O/P NEW LOW 30 MIN: CPT | Mod: S$GLB,,, | Performed by: PODIATRIST

## 2023-05-03 PROCEDURE — 99999 PR PBB SHADOW E&M-EST. PATIENT-LVL III: CPT | Mod: PBBFAC,,, | Performed by: PODIATRIST

## 2023-05-03 PROCEDURE — 3008F BODY MASS INDEX DOCD: CPT | Mod: CPTII,S$GLB,, | Performed by: PODIATRIST

## 2023-05-03 PROCEDURE — 3077F PR MOST RECENT SYSTOLIC BLOOD PRESSURE >= 140 MM HG: ICD-10-PCS | Mod: CPTII,S$GLB,, | Performed by: PODIATRIST

## 2023-05-03 PROCEDURE — 1126F PR PAIN SEVERITY QUANTIFIED, NO PAIN PRESENT: ICD-10-PCS | Mod: CPTII,S$GLB,, | Performed by: PODIATRIST

## 2023-05-03 PROCEDURE — 3066F PR DOCUMENTATION OF TREATMENT FOR NEPHROPATHY: ICD-10-PCS | Mod: CPTII,S$GLB,, | Performed by: PODIATRIST

## 2023-05-03 PROCEDURE — 3288F FALL RISK ASSESSMENT DOCD: CPT | Mod: CPTII,S$GLB,, | Performed by: PODIATRIST

## 2023-05-03 PROCEDURE — 3051F HG A1C>EQUAL 7.0%<8.0%: CPT | Mod: CPTII,S$GLB,, | Performed by: PODIATRIST

## 2023-05-03 PROCEDURE — 3008F PR BODY MASS INDEX (BMI) DOCUMENTED: ICD-10-PCS | Mod: CPTII,S$GLB,, | Performed by: PODIATRIST

## 2023-05-03 PROCEDURE — 3060F PR POS MICROALBUMINURIA RESULT DOCUMENTED/REVIEW: ICD-10-PCS | Mod: CPTII,S$GLB,, | Performed by: PODIATRIST

## 2023-05-03 PROCEDURE — 3060F POS MICROALBUMINURIA REV: CPT | Mod: CPTII,S$GLB,, | Performed by: PODIATRIST

## 2023-05-03 PROCEDURE — 4010F ACE/ARB THERAPY RXD/TAKEN: CPT | Mod: CPTII,S$GLB,, | Performed by: PODIATRIST

## 2023-05-03 PROCEDURE — 1126F AMNT PAIN NOTED NONE PRSNT: CPT | Mod: CPTII,S$GLB,, | Performed by: PODIATRIST

## 2023-05-03 RX ORDER — SILDENAFIL 50 MG/1
50 TABLET, FILM COATED ORAL DAILY PRN
COMMUNITY
Start: 2023-04-06

## 2023-05-03 RX ORDER — TIMOLOL MALEATE 5 MG/ML
SOLUTION/ DROPS OPHTHALMIC 2 TIMES DAILY
COMMUNITY
Start: 2023-04-06

## 2023-05-03 RX ORDER — CLOTRIMAZOLE AND BETAMETHASONE DIPROPIONATE 10; .64 MG/G; MG/G
CREAM TOPICAL 2 TIMES DAILY
Qty: 45 G | Refills: 2 | Status: SHIPPED | OUTPATIENT
Start: 2023-05-03

## 2023-05-10 NOTE — PROGRESS NOTES
Subjective:      Patient ID: Clement Sweet is a 68 y.o. male.    Chief Complaint:   Diabetic Foot Exam    Clement is a 68 y.o. male who presents to the clinic complaining of thick and discolored toenails on both feet. Clement is inquiring about treatment options.    Review of Systems   Constitutional: Negative for chills, decreased appetite, fever and malaise/fatigue.   HENT:  Negative for congestion, hearing loss, nosebleeds and tinnitus.    Eyes:  Negative for double vision, pain, photophobia and visual disturbance.   Cardiovascular:  Negative for chest pain, claudication, cyanosis and leg swelling.   Respiratory:  Negative for cough, hemoptysis, shortness of breath and wheezing.    Endocrine: Negative for cold intolerance and heat intolerance.   Hematologic/Lymphatic: Negative for adenopathy and bleeding problem.   Skin:  Positive for color change, dry skin and itching. Negative for nail changes and suspicious lesions.   Musculoskeletal:  Positive for arthritis. Negative for joint pain, myalgias and stiffness.   Gastrointestinal:  Negative for abdominal pain, jaundice, nausea and vomiting.   Genitourinary:  Negative for dysuria, frequency and hematuria.   Neurological:  Negative for difficulty with concentration, loss of balance, numbness, paresthesias and sensory change.   Psychiatric/Behavioral:  Negative for altered mental status, hallucinations and suicidal ideas. The patient is not nervous/anxious.    Allergic/Immunologic: Negative for environmental allergies and persistent infections.         Objective:      Physical Exam  Constitutional:       Appearance: He is well-developed.   HENT:      Head: Normocephalic and atraumatic.   Cardiovascular:      Pulses:           Dorsalis pedis pulses are 2+ on the right side and 2+ on the left side.        Posterior tibial pulses are 2+ on the right side and 2+ on the left side.   Pulmonary:      Effort: Pulmonary effort is normal.   Musculoskeletal:      Right foot: Normal  range of motion. No deformity.      Left foot: Normal range of motion. No deformity.      Comments: Inspection and palpation of the muscles joints and bones of both lower extremities reveal that muscle strength for the anterior, lateral, and posterior muscle groups and intrinsic muscle groups of the foot are all 5 over 5 symmetrical. Ankle, subtalar, midtarsal, and digital joint range of motion  are within normal limits, nonpainful, without crepitus or effusion. Patient exhibits a normal angle and base of gait. Palpation of the tendons reveal no defects.     Feet:      Right foot:      Skin integrity: No skin breakdown or erythema.      Left foot:      Skin integrity: No skin breakdown or erythema.   Skin:     General: Skin is warm and dry.      Nails: There is no clubbing.      Comments: Skin turgor is normal bilaterally. Skin texture is well hydrated to both lower extremities.     Scaling dryness in a moccasin distribution is noted to the bilateral lower extremities with associated erythema.         Neurological:      Mental Status: He is alert and oriented to person, place, and time.      Deep Tendon Reflexes:      Reflex Scores:       Patellar reflexes are 2+ on the right side and 2+ on the left side.       Achilles reflexes are 2+ on the right side and 2+ on the left side.     Comments: Sharp, dull, light touch, vibratory, and proprioceptive sensation are intact bilaterally. Deep tendon reflexes to patellar and Achilles tendon are symmetrical, 2/4 bilaterally. No ankle clonus or Babinski reflexes noted bilaterally. Coordination is normal to both feet and lower extremities.   Psychiatric:         Behavior: Behavior normal.           Assessment:       Encounter Diagnoses   Name Primary?    Type 2 diabetes mellitus with microalbuminuria, without long-term current use of insulin     Pain in both feet Yes    Tinea pedis of both feet      Independent visualization of imaging was performed.  Results were reviewed  in detail with patient.       Plan:       Clement was seen today for diabetic foot exam.    Diagnoses and all orders for this visit:    Pain in both feet    Type 2 diabetes mellitus with microalbuminuria, without long-term current use of insulin  -     Ambulatory referral/consult to Podiatry    Tinea pedis of both feet    Other orders  -     clotrimazole-betamethasone 1-0.05% (LOTRISONE) cream; Apply topically 2 (two) times daily.    Begin topical Lotrisone cream b.i.d. times 2-4 weeks.  I counseled the patient on his conditions, their implications and medical management.    The nature of the condition, options for management, as well as potential risks and complications were discussed in detail with patient. Patient was amenable to my recommendations and left my office fully informed and will follow up as instructed or sooner if necessary.

## 2023-09-20 ENCOUNTER — HOSPITAL ENCOUNTER (OUTPATIENT)
Dept: RADIOLOGY | Facility: HOSPITAL | Age: 68
Discharge: HOME | End: 2023-09-20
Attending: NURSE PRACTITIONER
Payer: MEDICARE

## 2023-09-20 DIAGNOSIS — K74.00 HEPATIC FIBROSIS: ICD-10-CM

## 2023-09-20 DIAGNOSIS — R74.01 ELEVATED TRANSAMINASE LEVEL: ICD-10-CM

## 2023-09-20 DIAGNOSIS — K76.0 NAFLD (NONALCOHOLIC FATTY LIVER DISEASE): ICD-10-CM

## 2023-09-20 PROCEDURE — 76391 MR ELASTOGRAPHY: CPT | Mod: TC

## 2023-09-20 PROCEDURE — 76391 MR ELASTOGRAPHY: CPT | Mod: 26,,, | Performed by: RADIOLOGY

## 2023-09-20 PROCEDURE — 76391 MR ELASTOGRAPHY: ICD-10-PCS | Mod: 26,,, | Performed by: RADIOLOGY

## 2023-09-27 ENCOUNTER — OFFICE VISIT (OUTPATIENT)
Dept: HEPATOLOGY | Facility: CLINIC | Age: 68
End: 2023-09-27
Payer: MEDICARE

## 2023-09-27 VITALS — WEIGHT: 199.31 LBS | HEIGHT: 69 IN | BODY MASS INDEX: 29.52 KG/M2

## 2023-09-27 DIAGNOSIS — K74.00 HEPATIC FIBROSIS: ICD-10-CM

## 2023-09-27 DIAGNOSIS — E78.5 HYPERLIPIDEMIA ASSOCIATED WITH TYPE 2 DIABETES MELLITUS: ICD-10-CM

## 2023-09-27 DIAGNOSIS — K76.0 NAFLD (NONALCOHOLIC FATTY LIVER DISEASE): Primary | ICD-10-CM

## 2023-09-27 DIAGNOSIS — R74.01 ELEVATED TRANSAMINASE LEVEL: ICD-10-CM

## 2023-09-27 DIAGNOSIS — E11.59 HYPERTENSION ASSOCIATED WITH DIABETES: ICD-10-CM

## 2023-09-27 DIAGNOSIS — I15.2 HYPERTENSION ASSOCIATED WITH DIABETES: ICD-10-CM

## 2023-09-27 DIAGNOSIS — E66.9 TYPE 2 DIABETES MELLITUS WITH OBESITY: ICD-10-CM

## 2023-09-27 DIAGNOSIS — E11.69 HYPERLIPIDEMIA ASSOCIATED WITH TYPE 2 DIABETES MELLITUS: ICD-10-CM

## 2023-09-27 DIAGNOSIS — E11.69 TYPE 2 DIABETES MELLITUS WITH OBESITY: ICD-10-CM

## 2023-09-27 PROCEDURE — 3066F PR DOCUMENTATION OF TREATMENT FOR NEPHROPATHY: ICD-10-PCS | Mod: CPTII,S$GLB,, | Performed by: NURSE PRACTITIONER

## 2023-09-27 PROCEDURE — 99999 PR PBB SHADOW E&M-EST. PATIENT-LVL III: CPT | Mod: PBBFAC,,, | Performed by: NURSE PRACTITIONER

## 2023-09-27 PROCEDURE — 3060F PR POS MICROALBUMINURIA RESULT DOCUMENTED/REVIEW: ICD-10-PCS | Mod: CPTII,S$GLB,, | Performed by: NURSE PRACTITIONER

## 2023-09-27 PROCEDURE — 4010F ACE/ARB THERAPY RXD/TAKEN: CPT | Mod: CPTII,S$GLB,, | Performed by: NURSE PRACTITIONER

## 2023-09-27 PROCEDURE — 3060F POS MICROALBUMINURIA REV: CPT | Mod: CPTII,S$GLB,, | Performed by: NURSE PRACTITIONER

## 2023-09-27 PROCEDURE — 1126F AMNT PAIN NOTED NONE PRSNT: CPT | Mod: CPTII,S$GLB,, | Performed by: NURSE PRACTITIONER

## 2023-09-27 PROCEDURE — 3288F PR FALLS RISK ASSESSMENT DOCUMENTED: ICD-10-PCS | Mod: CPTII,S$GLB,, | Performed by: NURSE PRACTITIONER

## 2023-09-27 PROCEDURE — 99999 PR PBB SHADOW E&M-EST. PATIENT-LVL III: ICD-10-PCS | Mod: PBBFAC,,, | Performed by: NURSE PRACTITIONER

## 2023-09-27 PROCEDURE — 3008F BODY MASS INDEX DOCD: CPT | Mod: CPTII,S$GLB,, | Performed by: NURSE PRACTITIONER

## 2023-09-27 PROCEDURE — 3288F FALL RISK ASSESSMENT DOCD: CPT | Mod: CPTII,S$GLB,, | Performed by: NURSE PRACTITIONER

## 2023-09-27 PROCEDURE — 1159F PR MEDICATION LIST DOCUMENTED IN MEDICAL RECORD: ICD-10-PCS | Mod: CPTII,S$GLB,, | Performed by: NURSE PRACTITIONER

## 2023-09-27 PROCEDURE — 99214 PR OFFICE/OUTPT VISIT, EST, LEVL IV, 30-39 MIN: ICD-10-PCS | Mod: S$GLB,,, | Performed by: NURSE PRACTITIONER

## 2023-09-27 PROCEDURE — 99214 OFFICE O/P EST MOD 30 MIN: CPT | Mod: S$GLB,,, | Performed by: NURSE PRACTITIONER

## 2023-09-27 PROCEDURE — 4010F PR ACE/ARB THEARPY RXD/TAKEN: ICD-10-PCS | Mod: CPTII,S$GLB,, | Performed by: NURSE PRACTITIONER

## 2023-09-27 PROCEDURE — 3051F PR MOST RECENT HEMOGLOBIN A1C LEVEL 7.0 - < 8.0%: ICD-10-PCS | Mod: CPTII,S$GLB,, | Performed by: NURSE PRACTITIONER

## 2023-09-27 PROCEDURE — 1126F PR PAIN SEVERITY QUANTIFIED, NO PAIN PRESENT: ICD-10-PCS | Mod: CPTII,S$GLB,, | Performed by: NURSE PRACTITIONER

## 2023-09-27 PROCEDURE — 1101F PT FALLS ASSESS-DOCD LE1/YR: CPT | Mod: CPTII,S$GLB,, | Performed by: NURSE PRACTITIONER

## 2023-09-27 PROCEDURE — 3008F PR BODY MASS INDEX (BMI) DOCUMENTED: ICD-10-PCS | Mod: CPTII,S$GLB,, | Performed by: NURSE PRACTITIONER

## 2023-09-27 PROCEDURE — 3051F HG A1C>EQUAL 7.0%<8.0%: CPT | Mod: CPTII,S$GLB,, | Performed by: NURSE PRACTITIONER

## 2023-09-27 PROCEDURE — 3066F NEPHROPATHY DOC TX: CPT | Mod: CPTII,S$GLB,, | Performed by: NURSE PRACTITIONER

## 2023-09-27 PROCEDURE — 1159F MED LIST DOCD IN RCRD: CPT | Mod: CPTII,S$GLB,, | Performed by: NURSE PRACTITIONER

## 2023-09-27 PROCEDURE — 1101F PR PT FALLS ASSESS DOC 0-1 FALLS W/OUT INJ PAST YR: ICD-10-PCS | Mod: CPTII,S$GLB,, | Performed by: NURSE PRACTITIONER

## 2023-09-27 RX ORDER — BLOOD-GLUCOSE METER
EACH MISCELLANEOUS
COMMUNITY
Start: 2023-07-14

## 2023-09-27 RX ORDER — BLOOD SUGAR DIAGNOSTIC
STRIP MISCELLANEOUS
COMMUNITY
Start: 2023-07-17

## 2023-09-27 NOTE — PATIENT INSTRUCTIONS
Labs this year to monitor liver enzymes. Can use external orders with [facility]  Visit in 1 year with labs and ultrasound        FATTY LIVER EDUCATION:  There is no FDA approved therapy for non-alcoholic fatty liver disease (NAFLD); therefore, lifestyle changes are important:  1. Weight loss goal of 15-20 lbs    *Weight loss of 5% has been shown to reduce fat in the liver  *Weight loss of 7-10% has been shown to improve fatty liver, inflammation from fatty liver, and liver fibrosis (scarring/damage)    2. Decreasing daily calories is recommended for weight loss. Try to limit carbohydrate intake to LESS than 30-45 grams of carbs with a meal and LESS than 5-10 grams of carbs with a snack. Avoid drinking beverages with sugar/carbohydrates. Meeting with a dietician or using one of the weight loss apps below can be helpful to determine individualized calorie goals and add up carbs throughout the day. Look for snacks high in protein, low in carbohydrates/sugar.    3. Exercise as tolerated. Studies have shown that exercise improves fatty liver even without weight loss.     4. Recommend good control of blood pressure, cholesterol, and blood sugar levels as these are risk factors for fatty liver. Cholesterol lowering medications including statins are typically safe and beneficial (even if liver enzymes are elevated).    5. Limit alcohol consumption, no more than 2 serving(s) of alcohol in any day (1 serving is 5 ounces of wine, 12 ounces of beer, or 1.5 ounces of liquor). Do not recommend daily alcohol use or drinking alcohol most days per week.    In some people, fatty liver can progress to steatohepatitis (inflamatory fatty liver). This can cause liver scarring or damage (fibrosis) and possibly to cirrhosis. Cirrhosis increases risk of liver cancer and liver failure. Lifestyle changes now can help to decrease this risk.     Ask about our fatty liver/MONTE clinical trials if you have fibrosis / scar tissue related to fatty  liver.        Additional Resources:    Websites with information about fatty liver and inflammation related to fatty liver (MONTE): www.nashtruth.com and www.nashactually.com   [facility]: Non-Alcoholic Fatty Liver Disease (NAFLD)    Facebook support group with tips and recipes:   Non-Alcoholic Fatty Liver Disease (NAFLD) Diet & Nutrition Support     Can download Roundarch Pal or Lose It davon to add up your carbohydrates throughout the day.      Try www.Farmol for recipes.    If interested in seeing a dietician to create a weight loss plan, contact the dietician team at [facility]: [email or web address].  You can also call to schedule a consult with a dietician: [phone]  *Virtual visits are available with one of our dieticians.     If you have diabetes or high blood pressure, you can meet with a Health  through [facility] program. Let us know if you are interested in a referral.     Let us know if you are interested in a referral to [facility].      Phone numbers:  Bariatric medicine - [phone]  Medical Fitness - [phone]

## 2023-09-27 NOTE — PROGRESS NOTES
[facility] - Established Patient    Last Clinic Visit: 9/27/22    Chief Complaint: Follow-up for fatty liver       HISTORY     This is a 68 y.o. male with PMH noted below, here for follow-up of fatty liver.    First told that he had fatty liver many years ago.     Fatty liver first noted on US in our system 8/19/21.  Imaging without findings to suggest advanced liver disease.    Risk factors for fatty liver include:   BMI >30  HTN, HLD, DM    His liver enzymes have been elevated for multiple years, ALT>AST, <100.     Serologic workup has been negative for Rickie's, alpha-1 antitrypsin deficiency, hemochromatosis, autoimmune etiology, and viral hepatitis. Ferritin elevated, >500 though iron sat WNL.    Fibrosis staging:   Fibroscan 8/2021 = F3, S3  MRI elastography 9/2021 = F1-2, moderate-severe steatosis  MRI elastography 9/2023 = F1-2, moderate-severe steatosis     Interval history:  Tried milk thistle though not taking this regularly.     No symptoms of hepatic decompensation including jaundice, ascites, cognitive problems to suggest hepatic encephalopathy, or GI bleeding.     Updates on risk factors for fatty liver:  Weight -- Body mass index is 29.43 kg/m².     Weight is down ~10 lb   Cutting back on carbohydrates                     Dyslipidemia -- well controlled  On statin                               Insulin resistance / diabetes -- HgbA1c 7.1        Alcohol use -- none    Health Maintenance:  - Most recent imaging: MRI 9/20/23 without focal hepatic lesion  - Hepatitis A & B vaccination: completed 1/3 vaccines          Past medical history, surgical history, problem list, family history, social history, allergies: Reviewed and updated in the appropriate section of the electronic medical record.      Current Outpatient Medications   Medication Sig Dispense Refill    atorvastatin (LIPITOR) 20 MG tablet Take 1 tablet (20 mg total) by mouth once daily. 90 tablet 3    clotrimazole-betamethasone  1-0.05% (LOTRISONE) cream Apply topically 2 (two) times daily. 45 g 2    fluticasone propionate (FLONASE) 50 mcg/actuation nasal spray       losartan (COZAAR) 50 MG tablet Take 1 tablet (50 mg total) by mouth once daily. 90 tablet 3    ONETOUCH ULTRA TEST Strp       sildenafiL (VIAGRA) 50 MG tablet Take 50 mg by mouth daily as needed.      tamsulosin (FLOMAX) 0.4 mg Cap Take 1 capsule (0.4 mg total) by mouth once daily. 90 capsule 3    timolol maleate 0.5% (TIMOPTIC) 0.5 % Drop Place into both eyes 2 (two) times daily.      TRUE METRIX AIR GLUCOSE METER kit Use 1 kit as directed twice a day  for diabetes. PlEASE INCLUDE TEST STRIPS AND LANCETS.       No current facility-administered medications for this visit.     Medication list reviewed and updated.      Review of Systems - as per HPI  Constitutional: Negative for fatigue or unexpected weight change.   Respiratory: Negative for shortness of breath.    Cardiovascular: Negative for leg swelling.  Gastrointestinal: Negative for abdominal distention or abdominal pain. Negative for melena or hematemesis.  Musculoskeletal: Negative for myalgias.    Skin: Negative for jaundice or itching.  Neurological: Negative for confusion or slowed mentation. Negative for tremors.   Hematological: Does not bruise/bleed easily.   Psychiatric: Negative for sleep disturbance.      Physical Exam   Constitutional: Well-nourished. No distress. Alert and oriented.  Eyes: No scleral icterus.   Pulmonary/Chest: Respiratory effort normal. No respiratory distress.   Abdominal: No distension, no ascites appreciated.   Extremities: No edema.   Neurological: No tremor.   Skin: No jaundice. No spider telangiectasias.  Psychiatric: Normal mood and affect. Speech, behavior, and thought content normal.         LABS & DIAGNOSTIC STUDIES     I have personally reviewed pertinent laboratory findings:    Lab Results   Component Value Date    ALT 63 (H) 09/20/2022    AST 34 09/20/2022    ALKPHOS 57  09/20/2022    BILITOT 0.5 09/20/2022    ALBUMIN 3.8 09/20/2022    INR 1.0 09/20/2021       Lab Results   Component Value Date    WBC 5.93 07/30/2021    HGB 15.4 07/30/2021    HCT 48.4 07/30/2021    MCV 95 07/30/2021     07/30/2021       Lab Results   Component Value Date     01/09/2023    K 5.0 01/09/2023    BUN 13 01/09/2023    CREATININE 1.0 01/09/2023    ESTGFRAFRICA >60.0 09/20/2021    EGFRNONAA >60.0 09/20/2021       Lab Results   Component Value Date    SMOOTHMUSCAB Negative 1:40 09/20/2021    AMAIFA Negative 1:40 09/20/2021    IGGSERUM 1530 09/20/2021    ANASCREEN Positive (A) 09/20/2021    FERRITIN 595 (H) 07/30/2021    FESATURATED 28 07/30/2021    CERULOPLSM 25.0 09/20/2021    HEPBSAG Negative 07/30/2021    HEPBIGM Negative 07/30/2021    HEPBCAB Negative 09/20/2021    HEPCAB Negative 07/30/2021    HEPAIGM Negative 07/30/2021       Lab Results   Component Value Date    AFP 2.9 09/20/2021       I have personally reviewed the following result reports:  Abdominal US - 9/20/22  MRI - 9/20/23      ASSESSMENT & PLAN     68 y.o. male with:    1. NAFLD with hepatic fibrosis  -- MRI elastography continues to show mild-moderate fibrosis, F1-2. Repeat staging in 2 years, may consider Fibroscan again as he has lost some weight.   -- ALT mildly elevated last year though liver tests overall improved with weight loss. Will have labs soon with PCP, external orders given.  -- Update US prior to next visit   -- Finish hep A/B vaccines    Reminded that the only treatment for fatty liver is weight loss, maintaining good control of metabolic risk factors (blood pressure, cholesterol, and blood sugar), and moderating alcohol use.      2. Body mass index is 29.43 kg/m²., HTN, HLD, DM  -- Reviewed weight loss strategies including dietary changes. Recommend low carbohydrate/sugar, high protein/fiber diet. Recommend long-term weight loss goal of 10% (about 15-20 lb).   -- Discussed goal of controlling metabolic risk  factors to prevent progression of hepatic fibrosis but also lower risk of CV disease       Orders Placed This Encounter   Procedures    US Abdomen Limited    Hepatic Function Panel    Hepatic Function Panel       *See AVS for patient education and instructions.      Return to clinic in 1 year with labs/US.      Thank you for allowing me to participate in the care of Clement Guadarrama, FNP-C  Hepatology        Duration of encounter: 30 min  This includes face to face time and non-face to face time preparing to see the patient (eg, review of tests), obtaining and/or reviewing separately obtained history, documenting clinical information in the electronic or other health record, independently interpreting results and communicating results to the patient/family/caregiver, or care coordination.

## 2024-07-23 ENCOUNTER — TELEPHONE (OUTPATIENT)
Dept: HEPATOLOGY | Facility: CLINIC | Age: 69
End: 2024-07-23
Payer: MEDICARE

## 2024-07-24 ENCOUNTER — TELEPHONE (OUTPATIENT)
Dept: HEPATOLOGY | Facility: CLINIC | Age: 69
End: 2024-07-24
Payer: MEDICARE

## 2024-07-24 NOTE — TELEPHONE ENCOUNTER
Patient was contacted to reschedule his upcoming appointment.  Patient answered the phone and left the line open without talking.  When call back the call went to the voicemail.  An voicemail was left with the call back .

## 2024-07-25 ENCOUNTER — TELEPHONE (OUTPATIENT)
Dept: HEPATOLOGY | Facility: CLINIC | Age: 69
End: 2024-07-25
Payer: MEDICARE

## 2024-07-25 NOTE — TELEPHONE ENCOUNTER
----- Message from Melissa Feliciano sent at 7/24/2024 12:52 PM CDT -----  Regarding: sched appt  Contact: PT  [phone]  The patient called returning call from a couple day ago regarding appt needing to be r/s. States he is having some testing done today on his liver. Please advise  No further information provided    Patient can be contacted @# [phone]

## 2024-07-25 NOTE — TELEPHONE ENCOUNTER
Returned call to patient to reschedule appointment. Patient stated that he didn't have the schedule of his testing yet. It will be done in [facility]. He said that he will call us or we can call him next month.

## 2024-08-06 ENCOUNTER — OFFICE VISIT (OUTPATIENT)
Dept: OTOLARYNGOLOGY | Facility: CLINIC | Age: 69
End: 2024-08-06
Payer: MEDICARE

## 2024-08-06 VITALS
DIASTOLIC BLOOD PRESSURE: 77 MMHG | HEART RATE: 57 BPM | WEIGHT: 199.31 LBS | SYSTOLIC BLOOD PRESSURE: 165 MMHG | BODY MASS INDEX: 29.52 KG/M2 | HEIGHT: 69 IN

## 2024-08-06 DIAGNOSIS — G47.33 OSA (OBSTRUCTIVE SLEEP APNEA): Primary | ICD-10-CM

## 2024-08-06 PROCEDURE — 3008F BODY MASS INDEX DOCD: CPT | Mod: CPTII,,, | Performed by: OTOLARYNGOLOGY

## 2024-08-06 PROCEDURE — 3077F SYST BP >= 140 MM HG: CPT | Mod: CPTII,,, | Performed by: OTOLARYNGOLOGY

## 2024-08-06 PROCEDURE — 3078F DIAST BP <80 MM HG: CPT | Mod: CPTII,,, | Performed by: OTOLARYNGOLOGY

## 2024-08-06 PROCEDURE — 99204 OFFICE O/P NEW MOD 45 MIN: CPT | Mod: ,,, | Performed by: OTOLARYNGOLOGY

## 2024-08-06 PROCEDURE — 1159F MED LIST DOCD IN RCRD: CPT | Mod: CPTII,,, | Performed by: OTOLARYNGOLOGY

## 2024-08-06 PROCEDURE — 4010F ACE/ARB THERAPY RXD/TAKEN: CPT | Mod: CPTII,,, | Performed by: OTOLARYNGOLOGY

## 2024-08-06 PROCEDURE — 1160F RVW MEDS BY RX/DR IN RCRD: CPT | Mod: CPTII,,, | Performed by: OTOLARYNGOLOGY

## 2024-08-07 ENCOUNTER — TELEPHONE (OUTPATIENT)
Dept: ORTHOPEDICS | Facility: CLINIC | Age: 69
End: 2024-08-07
Payer: MEDICARE

## 2024-10-24 ENCOUNTER — OFFICE VISIT (OUTPATIENT)
Dept: UROLOGY | Facility: CLINIC | Age: 69
End: 2024-10-24
Payer: MEDICARE

## 2024-10-24 ENCOUNTER — LAB VISIT (OUTPATIENT)
Dept: LAB | Facility: OTHER | Age: 69
End: 2024-10-24
Attending: STUDENT IN AN ORGANIZED HEALTH CARE EDUCATION/TRAINING PROGRAM
Payer: MEDICARE

## 2024-10-24 VITALS
RESPIRATION RATE: 12 BRPM | WEIGHT: 199.31 LBS | HEIGHT: 69 IN | DIASTOLIC BLOOD PRESSURE: 77 MMHG | OXYGEN SATURATION: 100 % | SYSTOLIC BLOOD PRESSURE: 144 MMHG | HEART RATE: 55 BPM | BODY MASS INDEX: 29.52 KG/M2

## 2024-10-24 DIAGNOSIS — Z12.5 SCREENING FOR PROSTATE CANCER: ICD-10-CM

## 2024-10-24 DIAGNOSIS — N40.1 BPH WITH OBSTRUCTION/LOWER URINARY TRACT SYMPTOMS: ICD-10-CM

## 2024-10-24 DIAGNOSIS — N52.1 ERECTILE DYSFUNCTION DUE TO DISEASES CLASSIFIED ELSEWHERE: Primary | ICD-10-CM

## 2024-10-24 DIAGNOSIS — N13.8 BPH WITH OBSTRUCTION/LOWER URINARY TRACT SYMPTOMS: ICD-10-CM

## 2024-10-24 LAB
BILIRUB SERPL-MCNC: NORMAL MG/DL
BLOOD URINE, POC: NORMAL
CLARITY, POC UA: CLEAR
COLOR, POC UA: YELLOW
COMPLEXED PSA SERPL-MCNC: 0.4 NG/ML (ref 0–4)
GLUCOSE UR QL STRIP: 50
KETONES UR QL STRIP: NORMAL
LEUKOCYTE ESTERASE URINE, POC: NORMAL
NITRITE, POC UA: NORMAL
PH, POC UA: 5
PROTEIN, POC: NORMAL
SPECIFIC GRAVITY, POC UA: 1.01
UROBILINOGEN, POC UA: NORMAL

## 2024-10-24 PROCEDURE — 36415 COLL VENOUS BLD VENIPUNCTURE: CPT | Performed by: STUDENT IN AN ORGANIZED HEALTH CARE EDUCATION/TRAINING PROGRAM

## 2024-10-24 PROCEDURE — 84153 ASSAY OF PSA TOTAL: CPT | Performed by: STUDENT IN AN ORGANIZED HEALTH CARE EDUCATION/TRAINING PROGRAM

## 2024-10-24 RX ORDER — TADALAFIL 20 MG/1
20 TABLET ORAL DAILY PRN
Qty: 30 TABLET | Refills: 11 | Status: SHIPPED | OUTPATIENT
Start: 2024-10-24 | End: 2025-10-24

## 2024-10-24 NOTE — PROGRESS NOTES
"[facility]   Clinic Note    SUBJECTIVE:     Chief Complaint: ED    History of Present Illness:  Clement Sweet is a 69 y.o. male who presents to clinic for ED. He is established to our clinic but new to me. Seen by JOYA Mejia 04/2023.     On Flomax for BPH, LUTS well controlled.  Has ED. Has tried Viagra 50 mg with poor effect, Cialis 20 mg PRN works well.  T was 388 in 2021.    Anticoagulation:  No    OBJECTIVE:     Estimated body mass index is 29.43 kg/m² as calculated from the following:    Height as of this encounter: 5' 9" (1.753 m).    Weight as of this encounter: 90.4 kg (199 lb 4.7 oz).    Vital Signs (Most Recent)  Pulse: (!) 55 (10/24/24 0842)  Resp: 12 (10/24/24 0842)  BP: (!) 144/77 (10/24/24 0842)  SpO2: 100 % (10/24/24 0842)    Physical Exam  Vitals reviewed.   Constitutional:       Appearance: Normal appearance.   HENT:      Head: Normocephalic and atraumatic.   Eyes:      Conjunctiva/sclera: Conjunctivae normal.   Cardiovascular:      Rate and Rhythm: Normal rate.   Pulmonary:      Effort: Pulmonary effort is normal.   Abdominal:      General: Abdomen is flat. There is no distension.      Tenderness: There is no abdominal tenderness.   Skin:     General: Skin is warm and dry.   Neurological:      General: No focal deficit present.      Mental Status: He is alert and oriented to person, place, and time.   Psychiatric:         Mood and Affect: Mood normal.         Behavior: Behavior normal.         Thought Content: Thought content normal.         Judgment: Judgment normal.         Lab Results   Component Value Date    BUN 13 01/09/2023    CREATININE 1.0 01/09/2023    WBC 5.93 07/30/2021    HGB 15.4 07/30/2021    HCT 48.4 07/30/2021     07/30/2021    AST 34 09/20/2022    ALT 63 (H) 09/20/2022    ALKPHOS 57 09/20/2022    ALBUMIN 3.8 09/20/2022    HGBA1C 7.1 (H) 01/09/2023        Lab Results   Component Value Date    PSADIAG 0.43 04/17/2023    PSADIAG 0.35 04/26/2022    PSADIAG 0.37 04/07/2021 "         ASSESSMENT     1. Erectile dysfunction due to diseases classified elsewhere    2. Screening for prostate cancer    3. BPH with obstruction/lower urinary tract symptoms      PLAN:   1. Erectile dysfunction due to diseases classified elsewhere    2. Screening for prostate cancer  -     PSA, Screening; Future; Expected date: 10/24/2024  -     POCT URINE DIPSTICK WITHOUT MICROSCOPE    3. BPH with obstruction/lower urinary tract symptoms  Overview:  [facility] w/ Dr. Ferguson, reviewed records      Other orders  -     tadalafiL (CIALIS) 20 MG Tab; Take 1 tablet (20 mg total) by mouth daily as needed.  Dispense: 30 tablet; Refill: 11     Continue Cialis 20 mg and Flomax. RTC 1 year with [facility].   Screening PSA.    Sachin Hernandez MD

## 2024-10-29 ENCOUNTER — OFFICE VISIT (OUTPATIENT)
Dept: SLEEP MEDICINE | Facility: CLINIC | Age: 69
End: 2024-10-29
Payer: MEDICARE

## 2024-10-29 VITALS
SYSTOLIC BLOOD PRESSURE: 132 MMHG | DIASTOLIC BLOOD PRESSURE: 76 MMHG | WEIGHT: 203.69 LBS | HEART RATE: 58 BPM | BODY MASS INDEX: 34.77 KG/M2 | HEIGHT: 64 IN

## 2024-10-29 DIAGNOSIS — G47.33 OSA (OBSTRUCTIVE SLEEP APNEA): ICD-10-CM

## 2024-10-29 PROCEDURE — 3288F FALL RISK ASSESSMENT DOCD: CPT | Mod: CPTII,S$GLB,, | Performed by: NURSE PRACTITIONER

## 2024-10-29 PROCEDURE — 3008F BODY MASS INDEX DOCD: CPT | Mod: CPTII,S$GLB,, | Performed by: NURSE PRACTITIONER

## 2024-10-29 PROCEDURE — 3078F DIAST BP <80 MM HG: CPT | Mod: CPTII,S$GLB,, | Performed by: NURSE PRACTITIONER

## 2024-10-29 PROCEDURE — 99214 OFFICE O/P EST MOD 30 MIN: CPT | Mod: S$GLB,,, | Performed by: NURSE PRACTITIONER

## 2024-10-29 PROCEDURE — 4010F ACE/ARB THERAPY RXD/TAKEN: CPT | Mod: CPTII,S$GLB,, | Performed by: NURSE PRACTITIONER

## 2024-10-29 PROCEDURE — 3075F SYST BP GE 130 - 139MM HG: CPT | Mod: CPTII,S$GLB,, | Performed by: NURSE PRACTITIONER

## 2024-10-29 PROCEDURE — 99999 PR PBB SHADOW E&M-EST. PATIENT-LVL III: CPT | Mod: PBBFAC,,, | Performed by: NURSE PRACTITIONER

## 2024-10-29 PROCEDURE — 1159F MED LIST DOCD IN RCRD: CPT | Mod: CPTII,S$GLB,, | Performed by: NURSE PRACTITIONER

## 2024-10-29 PROCEDURE — 1101F PT FALLS ASSESS-DOCD LE1/YR: CPT | Mod: CPTII,S$GLB,, | Performed by: NURSE PRACTITIONER
